# Patient Record
Sex: FEMALE | Race: BLACK OR AFRICAN AMERICAN | NOT HISPANIC OR LATINO | URBAN - METROPOLITAN AREA
[De-identification: names, ages, dates, MRNs, and addresses within clinical notes are randomized per-mention and may not be internally consistent; named-entity substitution may affect disease eponyms.]

---

## 2017-06-16 ENCOUNTER — OUTPATIENT (OUTPATIENT)
Dept: OUTPATIENT SERVICES | Facility: HOSPITAL | Age: 22
LOS: 1 days | End: 2017-06-16

## 2017-06-16 VITALS
DIASTOLIC BLOOD PRESSURE: 64 MMHG | HEIGHT: 63 IN | OXYGEN SATURATION: 98 % | HEART RATE: 100 BPM | TEMPERATURE: 97 F | RESPIRATION RATE: 16 BRPM | SYSTOLIC BLOOD PRESSURE: 100 MMHG | WEIGHT: 160.06 LBS

## 2017-06-16 DIAGNOSIS — F84.0 AUTISTIC DISORDER: ICD-10-CM

## 2017-06-16 DIAGNOSIS — Z01.818 ENCOUNTER FOR OTHER PREPROCEDURAL EXAMINATION: ICD-10-CM

## 2017-06-16 DIAGNOSIS — T78.40XA ALLERGY, UNSPECIFIED, INITIAL ENCOUNTER: ICD-10-CM

## 2017-06-16 DIAGNOSIS — E11.9 TYPE 2 DIABETES MELLITUS WITHOUT COMPLICATIONS: ICD-10-CM

## 2017-06-16 LAB
ALBUMIN SERPL ELPH-MCNC: 4.4 G/DL — SIGNIFICANT CHANGE UP (ref 3.3–5)
ALP SERPL-CCNC: 78 U/L — SIGNIFICANT CHANGE UP (ref 40–120)
ALT FLD-CCNC: 19 U/L — SIGNIFICANT CHANGE UP (ref 4–33)
AST SERPL-CCNC: 26 U/L — SIGNIFICANT CHANGE UP (ref 4–32)
BILIRUB SERPL-MCNC: 0.2 MG/DL — SIGNIFICANT CHANGE UP (ref 0.2–1.2)
BUN SERPL-MCNC: 11 MG/DL — SIGNIFICANT CHANGE UP (ref 7–23)
CALCIUM SERPL-MCNC: 10 MG/DL — SIGNIFICANT CHANGE UP (ref 8.4–10.5)
CHLORIDE SERPL-SCNC: 99 MMOL/L — SIGNIFICANT CHANGE UP (ref 98–107)
CO2 SERPL-SCNC: 22 MMOL/L — SIGNIFICANT CHANGE UP (ref 22–31)
CREAT SERPL-MCNC: 0.85 MG/DL — SIGNIFICANT CHANGE UP (ref 0.5–1.3)
GLUCOSE SERPL-MCNC: 110 MG/DL — HIGH (ref 70–99)
HBA1C BLD-MCNC: 6.1 % — HIGH (ref 4–5.6)
HCG SERPL-ACNC: < 5 MIU/ML — SIGNIFICANT CHANGE UP
HCT VFR BLD CALC: 41.2 % — SIGNIFICANT CHANGE UP (ref 34.5–45)
HGB BLD-MCNC: 13 G/DL — SIGNIFICANT CHANGE UP (ref 11.5–15.5)
MCHC RBC-ENTMCNC: 27.8 PG — SIGNIFICANT CHANGE UP (ref 27–34)
MCHC RBC-ENTMCNC: 31.6 % — LOW (ref 32–36)
MCV RBC AUTO: 88.2 FL — SIGNIFICANT CHANGE UP (ref 80–100)
PLATELET # BLD AUTO: 232 K/UL — SIGNIFICANT CHANGE UP (ref 150–400)
PMV BLD: 10.8 FL — SIGNIFICANT CHANGE UP (ref 7–13)
POTASSIUM SERPL-MCNC: 4.4 MMOL/L — SIGNIFICANT CHANGE UP (ref 3.5–5.3)
POTASSIUM SERPL-SCNC: 4.4 MMOL/L — SIGNIFICANT CHANGE UP (ref 3.5–5.3)
PROT SERPL-MCNC: 8.2 G/DL — SIGNIFICANT CHANGE UP (ref 6–8.3)
RBC # BLD: 4.67 M/UL — SIGNIFICANT CHANGE UP (ref 3.8–5.2)
RBC # FLD: 16.4 % — HIGH (ref 10.3–14.5)
SODIUM SERPL-SCNC: 138 MMOL/L — SIGNIFICANT CHANGE UP (ref 135–145)
WBC # BLD: 5.74 K/UL — SIGNIFICANT CHANGE UP (ref 3.8–10.5)
WBC # FLD AUTO: 5.74 K/UL — SIGNIFICANT CHANGE UP (ref 3.8–10.5)

## 2017-06-16 RX ORDER — POLYETHYLENE GLYCOL 3350 17 G/17G
0 POWDER, FOR SOLUTION ORAL
Qty: 0 | Refills: 0 | COMMUNITY

## 2017-06-16 NOTE — H&P PST ADULT - PMH
Autism    Diabetes  recent weight loss metformin amie oates  Mentally challenged Autism    Diabetes  recent weight loss metformin dc d  Impulse disorder    Mentally challenged

## 2017-06-16 NOTE — H&P PST ADULT - PROBLEM SELECTOR PLAN 1
Examination Under Anesthesia , Pap smear with Cultures, Pelvic Ultrasound , Breast Exam     Pre op instructions given to Edda Devlin; medical coordinator ; Edda appears to have a good understanding of pre op instructions     UCG dos    Pt to  for pre op m/c Examination Under Anesthesia , Pap smear with Cultures, Pelvic Ultrasound , Breast Exam     Pre op instructions given to Edda Devlin; medical coordinator ; Edda appears to have a good understanding of pre op instructions     UCG dos to be ordered / ? regarding HCG dos     Pt to Dr for pre op m/c

## 2017-06-16 NOTE — H&P PST ADULT - HISTORY OF PRESENT ILLNESS
Pt is a 22 y.o. female ; pt resides in [facility] ; pt with h/o autism ; pt is accompanied by Medical Coordinator Edda Devlin ; information obtained from Medical Coordinator ; pt presents for Examination Under Anesthesia , pap smear with Cultures, Pelvic Ultrasound Breast Exam     As per Ms Devlin ; pt mother Ashly Head is aware of pts appt and has signed consent

## 2017-06-16 NOTE — H&P PST ADULT - PROBLEM SELECTOR PLAN 3
Regarding consent as per medical coordinator Edda Devlin ; mother has signed consent for procedure ; mother will be available dos by phone

## 2017-06-16 NOTE — H&P PST ADULT - RS GEN PE MLT RESP DETAILS PC
clear to auscultation bilaterally/good air movement/breath sounds equal/airway patent/respirations non-labored

## 2017-06-16 NOTE — H&P PST ADULT - NSANTHOSAYNRD_GEN_A_CORE
No. TIERNEY screening performed.  STOP BANG Legend: 0-2 = LOW Risk; 3-4 = INTERMEDIATE Risk; 5-8 = HIGH Risk

## 2017-06-27 ENCOUNTER — OUTPATIENT (OUTPATIENT)
Dept: OUTPATIENT SERVICES | Facility: HOSPITAL | Age: 22
LOS: 1 days | Discharge: HOME | End: 2017-06-27
Payer: MEDICARE

## 2017-06-27 ENCOUNTER — RESULT REVIEW (OUTPATIENT)
Age: 22
End: 2017-06-27

## 2017-06-27 VITALS
TEMPERATURE: 98 F | HEIGHT: 64 IN | OXYGEN SATURATION: 97 % | RESPIRATION RATE: 14 BRPM | WEIGHT: 160.94 LBS | DIASTOLIC BLOOD PRESSURE: 75 MMHG | SYSTOLIC BLOOD PRESSURE: 123 MMHG | HEART RATE: 84 BPM

## 2017-06-27 VITALS
DIASTOLIC BLOOD PRESSURE: 85 MMHG | OXYGEN SATURATION: 98 % | TEMPERATURE: 97 F | SYSTOLIC BLOOD PRESSURE: 115 MMHG | RESPIRATION RATE: 12 BRPM | HEART RATE: 83 BPM

## 2017-06-27 DIAGNOSIS — F84.0 AUTISTIC DISORDER: ICD-10-CM

## 2017-06-27 PROCEDURE — 57410 PELVIC EXAMINATION: CPT | Mod: GC

## 2017-06-27 PROCEDURE — 57452 EXAM OF CERVIX W/SCOPE: CPT | Mod: GC

## 2017-06-27 RX ORDER — SODIUM CHLORIDE 9 MG/ML
1000 INJECTION, SOLUTION INTRAVENOUS
Qty: 0 | Refills: 0 | Status: DISCONTINUED | OUTPATIENT
Start: 2017-06-27 | End: 2017-06-28

## 2017-06-27 NOTE — BRIEF OPERATIVE NOTE - PROCEDURE
Exam under anesthesia, gynecologic  06/27/2017    Active  [id number]  Exam under anesthesia, breast  06/27/2017    Active  [id number]  Pap smear  06/27/2017    Active  [id number]

## 2017-06-27 NOTE — ASU DISCHARGE PLAN (ADULT/PEDIATRIC). - NURSING INSTRUCTIONS
You received IV tylenol in the OR at 12:30 pm  , your next dose of tylenol (if needed) will be in 6 hrs at 6:30pm

## 2017-06-27 NOTE — BRIEF OPERATIVE NOTE - OPERATION/FINDINGS
intact hymen, scarring on bilateral breasts, R breast scarring upper later quadrant, L axilla scarring extending to back; abdominal scarring noted as well  small mobile uterus, retroverted, no adnexal masses palpated

## 2017-06-27 NOTE — ASU DISCHARGE PLAN (ADULT/PEDIATRIC). - MEDICATION SUMMARY - MEDICATIONS TO TAKE
I will START or STAY ON the medications listed below when I get home from the hospital:    gabapentin 300 mg oral tablet  --  by mouth 3 times a day  -- Indication: For home med    Depakote  mg oral tablet, extended release  -- 2 tab(s) by mouth once a day (at bedtime)  -- Indication: For home med    clonazePAM 1 mg oral tablet  --  by mouth 2 times a day  -- Indication: For home med    Depakote 500 mg oral delayed release tablet  -- 2 tab(s) by mouth once a day (at bedtime)  -- Indication: For home med    Latuda 80 mg oral tablet  -- 1 tab(s) by mouth once a day  -- Indication: For home med    QUEtiapine 400 mg oral tablet  --  by mouth once a day in am  -- Indication: For home med    QUEtiapine 200 mg oral tablet  -- 1 tab(s) by mouth 2 times a day 1pm and 9pm  -- Indication: For home med    Epi EZ Pen  --   , As Needed  -- Indication: For home med    Aquaphor topical ointment  -- Apply on skin to affected area 2 times a day  -- Indication: For home med    Dulcolax Laxative 5 mg oral delayed release tablet  --  by mouth 3 tabs in am  -- Indication: For home med    MiraLax oral powder for reconstitution  --  by mouth every other day  -- Indication: For home med    Vitamin D3 1000 intl units oral capsule  -- 1 cap(s) by mouth once a day  -- Indication: For home med

## 2017-06-28 ENCOUNTER — TRANSCRIPTION ENCOUNTER (OUTPATIENT)
Age: 22
End: 2017-06-28

## 2018-07-18 ENCOUNTER — APPOINTMENT (OUTPATIENT)
Dept: OBGYN | Facility: HOSPITAL | Age: 23
End: 2018-07-18

## 2018-07-20 PROBLEM — F63.9 IMPULSE DISORDER, UNSPECIFIED: Chronic | Status: ACTIVE | Noted: 2017-06-16

## 2018-07-20 PROBLEM — F84.0 AUTISTIC DISORDER: Chronic | Status: ACTIVE | Noted: 2017-06-16

## 2018-08-09 ENCOUNTER — OUTPATIENT (OUTPATIENT)
Dept: OUTPATIENT SERVICES | Facility: HOSPITAL | Age: 23
LOS: 1 days | End: 2018-08-09

## 2018-08-09 ENCOUNTER — APPOINTMENT (OUTPATIENT)
Dept: OBGYN | Facility: HOSPITAL | Age: 23
End: 2018-08-09
Payer: MEDICARE

## 2018-08-09 VITALS
WEIGHT: 155 LBS | HEIGHT: 64 IN | BODY MASS INDEX: 26.46 KG/M2 | SYSTOLIC BLOOD PRESSURE: 114 MMHG | HEART RATE: 97 BPM | DIASTOLIC BLOOD PRESSURE: 74 MMHG

## 2018-08-09 PROCEDURE — 99212 OFFICE O/P EST SF 10 MIN: CPT

## 2018-08-15 DIAGNOSIS — N64.89 OTHER SPECIFIED DISORDERS OF BREAST: ICD-10-CM

## 2018-08-28 ENCOUNTER — OUTPATIENT (OUTPATIENT)
Dept: OUTPATIENT SERVICES | Facility: HOSPITAL | Age: 23
LOS: 1 days | End: 2018-08-28
Payer: MEDICARE

## 2018-08-28 ENCOUNTER — APPOINTMENT (OUTPATIENT)
Dept: ULTRASOUND IMAGING | Facility: IMAGING CENTER | Age: 23
End: 2018-08-28
Payer: MEDICARE

## 2018-08-28 DIAGNOSIS — N63.0 UNSPECIFIED LUMP IN UNSPECIFIED BREAST: ICD-10-CM

## 2018-08-28 PROCEDURE — 76641 ULTRASOUND BREAST COMPLETE: CPT | Mod: 26,RT

## 2018-08-28 PROCEDURE — 76641 ULTRASOUND BREAST COMPLETE: CPT

## 2018-09-05 ENCOUNTER — OUTPATIENT (OUTPATIENT)
Dept: OUTPATIENT SERVICES | Facility: HOSPITAL | Age: 23
LOS: 1 days | End: 2018-09-05

## 2018-09-05 ENCOUNTER — APPOINTMENT (OUTPATIENT)
Dept: OBGYN | Facility: HOSPITAL | Age: 23
End: 2018-09-05
Payer: MEDICARE

## 2018-09-05 VITALS
WEIGHT: 152 LBS | BODY MASS INDEX: 26.09 KG/M2 | DIASTOLIC BLOOD PRESSURE: 68 MMHG | SYSTOLIC BLOOD PRESSURE: 109 MMHG | HEART RATE: 119 BPM

## 2018-09-05 DIAGNOSIS — N63.0 UNSPECIFIED LUMP IN UNSPECIFIED BREAST: ICD-10-CM

## 2018-09-05 PROCEDURE — 99213 OFFICE O/P EST LOW 20 MIN: CPT | Mod: GC

## 2018-09-06 DIAGNOSIS — N63.0 UNSPECIFIED LUMP IN UNSPECIFIED BREAST: ICD-10-CM
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
## 2019-09-16 ENCOUNTER — OUTPATIENT (OUTPATIENT)
Dept: OUTPATIENT SERVICES | Facility: HOSPITAL | Age: 24
LOS: 1 days | End: 2019-09-16

## 2019-09-16 ENCOUNTER — APPOINTMENT (OUTPATIENT)
Dept: OBGYN | Facility: HOSPITAL | Age: 24
End: 2019-09-16
Payer: MEDICARE

## 2019-09-16 VITALS
HEIGHT: 64 IN | SYSTOLIC BLOOD PRESSURE: 134 MMHG | DIASTOLIC BLOOD PRESSURE: 80 MMHG | HEART RATE: 91 BPM | BODY MASS INDEX: 23.98 KG/M2 | WEIGHT: 140.43 LBS

## 2019-09-16 DIAGNOSIS — Z00.00 ENCOUNTER FOR GENERAL ADULT MEDICAL EXAMINATION W/OUT ABNORMAL FINDINGS: ICD-10-CM

## 2019-09-16 PROCEDURE — G0101: CPT

## 2019-09-19 DIAGNOSIS — Z00.00 ENCOUNTER FOR GENERAL ADULT MEDICAL EXAMINATION WITHOUT ABNORMAL FINDINGS: ICD-10-CM

## 2019-09-23 NOTE — HISTORY OF PRESENT ILLNESS
[1 Year Ago] : 1 year ago [Good] : being in good health [Reproductive Age] : is of reproductive age [Definite:  ___ (Date)] : the last menstrual period was [unfilled] [Normal Amount/Duration] : was of a normal amount and duration [Regular Cycle Intervals] : periods have been regular [Frequency: Q ___ days] : menstrual periods occur approximately every [unfilled] days [Menstrual Problems] : reports normal menses [Weight Concerns] : no concerns with her weight [Pregnancy History] : denies prior pregnancies [Fever] : no fever [Nausea] : no nausea [Vomiting] : no vomiting [Diarrhea] : no diarrhea [Vaginal Bleeding] : no vaginal bleeding [Pelvic Pressure] : no pelvic pressure [Dysuria] : no dysuria [Spotting Between  Menses] : no spotting between menses [Sexually Active] : is not sexually active

## 2019-09-23 NOTE — PHYSICAL EXAM
[Awake] : awake [Alert] : alert [Soft] : soft [Flat] : flat [Soft, Nontender] : the abdomen was soft and nontender [No Mass] : no masses were palpated [No HSM] : no hepatosplenomegaly noted [No Lesions] : no genitalia lesions [Labia Majora] : labia major [Labia Minora] : labia minora [Normal] : clitoris [Pink Rugae] : pink rugae [RRR, No Murmurs] : RRR, no murmurs [Normal S1] : normal S1 [Normal Rate] : normal [Normal S2] : normal S2 [Arterial Pulses Equal At ___ Bilat (/N Is Sub: Default = /2)] : the peripheral pulses were 2+ and symmetric [No Pitting Edema] : no pitting edema present [CTAB] : CTAB [Acute Distress] : no acute distress [LAD] : no lymphadenopathy [Goiter] : no goiter [Tender] : non tender [Distended] : not distended [H/Smegaly] : no hepatosplenomegaly [Urethral Discharge] : no ~M urethral discharge [Pap Obtained] : a Pap smear was not performed [Discharge] : had no discharge

## 2019-09-23 NOTE — REVIEW OF SYSTEMS
[Fever] : no fever [Chills] : no chills [Recent Wt Gain ___ Lbs] : no recent weight gain [Pain] : no pain [Sight Problems] : no sight problems [Dec Hearing] : no hearing loss [Sore Throat] : no sore throat [Heart Rate Is Fast] : the heart rate was not fast [Chest Pain] : no chest pain [Wheezing] : no wheezing [Dyspnea] : no shortness of breath [SOB on Exertion] : no shortness of breath during exertion [Cough] : no cough [Abdominal Pain] : no abdominal pain [Vomiting] : no vomiting [Constipation] : no constipation [Diarrhea] : no diarrhea [Incontinence] : no incontinence [Abn Vag Bleeding] : no abnormal vaginal bleeding [Pelvic Pain] : no pelvic pain [Frequency] : no frequency [Urgency] : no urgency [Urethral Discharge] : no abnormal urethral discharge [Arthralgias] : no arthralgias [Joint Pain] : no joint pain [Skin Lesions] : no skin lesions [Breast Pain] : no breast pain [Fainting] : no fainting [Headache] : no headache [Anxiety] : no anxiety [Depression] : no depression

## 2019-11-04 NOTE — H&P PST ADULT - VENOUS THROMBOEMBOLISM HISTORY
spoon/fed by clinician/self fed/bites of cookie cup/spoon/self fed/fed by clinician/straw negative history

## 2020-02-27 NOTE — H&P PST ADULT - EXTREMITIES
What Type Of Note Output Would You Prefer (Optional)?: Standard Output
How Severe Is Your Rash?: moderate
Is This A New Presentation, Or A Follow-Up?: Rash
detailed exam

## 2020-10-28 ENCOUNTER — OUTPATIENT (OUTPATIENT)
Dept: OUTPATIENT SERVICES | Facility: HOSPITAL | Age: 25
LOS: 1 days | End: 2020-10-28

## 2020-10-28 ENCOUNTER — APPOINTMENT (OUTPATIENT)
Dept: OBGYN | Facility: HOSPITAL | Age: 25
End: 2020-10-28
Payer: MEDICARE

## 2020-10-28 VITALS
SYSTOLIC BLOOD PRESSURE: 130 MMHG | DIASTOLIC BLOOD PRESSURE: 71 MMHG | TEMPERATURE: 97.3 F | HEART RATE: 94 BPM | HEIGHT: 64 IN | WEIGHT: 176 LBS | BODY MASS INDEX: 30.05 KG/M2

## 2020-10-28 PROCEDURE — 99213 OFFICE O/P EST LOW 20 MIN: CPT | Mod: GC

## 2020-10-29 NOTE — REVIEW OF SYSTEMS
[Fever] : no fever [Chills] : no chills [Abdominal Pain] : no abdominal pain [Vomiting] : no vomiting [Breast Pain] : no breast pain [Breast Lump] : no breast lump [Nipple Discharge] : no nipple discharge

## 2020-10-29 NOTE — PHYSICAL EXAM
[Alert] : alert [No Acute Distress] : no acute distress [No Lymphadenopathy] : no lymphadenopathy [Regular Rate Rhythm] : regular rate rhythm [Clear to Auscultation B/L] : clear to auscultation bilaterally [Soft] : soft [Non-tender] : non-tender [No Mass] : no mass [FreeTextEntry6] : No masses, no tenderness, no nipple discharge

## 2020-10-30 ENCOUNTER — NON-APPOINTMENT (OUTPATIENT)
Age: 25
End: 2020-10-30

## 2020-10-30 DIAGNOSIS — Z01.419 ENCOUNTER FOR GYNECOLOGICAL EXAMINATION (GENERAL) (ROUTINE) WITHOUT ABNORMAL FINDINGS: ICD-10-CM

## 2020-12-15 ENCOUNTER — NON-APPOINTMENT (OUTPATIENT)
Age: 25
End: 2020-12-15

## 2021-01-22 ENCOUNTER — NON-APPOINTMENT (OUTPATIENT)
Age: 26
End: 2021-01-22

## 2021-05-17 ENCOUNTER — OUTPATIENT (OUTPATIENT)
Dept: OUTPATIENT SERVICES | Facility: HOSPITAL | Age: 26
LOS: 1 days | End: 2021-05-17
Payer: MEDICARE

## 2021-05-17 VITALS
HEART RATE: 83 BPM | TEMPERATURE: 98 F | OXYGEN SATURATION: 100 % | DIASTOLIC BLOOD PRESSURE: 84 MMHG | WEIGHT: 169.98 LBS | RESPIRATION RATE: 18 BRPM | SYSTOLIC BLOOD PRESSURE: 126 MMHG | HEIGHT: 60 IN

## 2021-05-17 DIAGNOSIS — Z01.818 ENCOUNTER FOR OTHER PREPROCEDURAL EXAMINATION: ICD-10-CM

## 2021-05-17 DIAGNOSIS — K02.62 DENTAL CARIES ON SMOOTH SURFACE PENETRATING INTO DENTIN: ICD-10-CM

## 2021-05-17 DIAGNOSIS — Z98.890 OTHER SPECIFIED POSTPROCEDURAL STATES: Chronic | ICD-10-CM

## 2021-05-17 DIAGNOSIS — K05.6 PERIODONTAL DISEASE, UNSPECIFIED: ICD-10-CM

## 2021-05-17 DIAGNOSIS — K02.9 DENTAL CARIES, UNSPECIFIED: ICD-10-CM

## 2021-05-17 DIAGNOSIS — J38.3 OTHER DISEASES OF VOCAL CORDS: Chronic | ICD-10-CM

## 2021-05-17 DIAGNOSIS — E11.9 TYPE 2 DIABETES MELLITUS WITHOUT COMPLICATIONS: ICD-10-CM

## 2021-05-17 PROCEDURE — G0463: CPT

## 2021-05-17 RX ORDER — QUETIAPINE FUMARATE 200 MG/1
0 TABLET, FILM COATED ORAL
Qty: 0 | Refills: 0 | DISCHARGE

## 2021-05-17 RX ORDER — DIVALPROEX SODIUM 500 MG/1
2 TABLET, DELAYED RELEASE ORAL
Qty: 0 | Refills: 0 | DISCHARGE

## 2021-05-17 RX ORDER — QUETIAPINE FUMARATE 200 MG/1
1 TABLET, FILM COATED ORAL
Qty: 0 | Refills: 0 | DISCHARGE

## 2021-05-17 RX ORDER — LANOLIN/MINERAL OIL
1 LOTION (ML) TOPICAL
Qty: 0 | Refills: 0 | DISCHARGE

## 2021-05-17 RX ORDER — GABAPENTIN 400 MG/1
0 CAPSULE ORAL
Qty: 0 | Refills: 0 | DISCHARGE

## 2021-05-17 RX ORDER — POLYETHYLENE GLYCOL 3350 17 G/17G
0 POWDER, FOR SOLUTION ORAL
Qty: 0 | Refills: 0 | DISCHARGE

## 2021-05-17 RX ORDER — LURASIDONE HYDROCHLORIDE 40 MG/1
1 TABLET ORAL
Qty: 0 | Refills: 0 | DISCHARGE

## 2021-05-17 NOTE — H&P PST ADULT - NSICDXPASTSURGICALHX_GEN_ALL_CORE_FT
Subjective:       Patient ID: Rosa Maria Kay is a 38 y.o. female.    Chief Complaint: Disease Management    HPI  39 yo F with PMH of RLS and interstitial cystitis for evaluation of YINA.  Reports that she has numbness sensation in right second and third toes bilaterally. Denies any swelling or stiffness.  Reports her symptoms have got worse. Reports her pain level is today is 2/10. Sometimes, she was diagnosed with RLS 2 years ago. She had crawling sensation in right leg and it was only at night.  Reports that the requip helped. Denies raynauds, oral ulcers, hair loss, or photosensitivity. Denies fevers or pleurisy.  Reports she wakes up really hot at night.  Denies triggers for her numbness. Denies any alleviating factors. She takes advil at night which helps her with improvement.  Reports that pain is worse in right side.    Past Medical History:   Diagnosis Date    Interstitial cystitis     Dr. Wheeler       Review of Systems   Constitutional: Negative for activity change, appetite change, chills, diaphoresis, fatigue, fever and unexpected weight change.   HENT: Negative for congestion, ear discharge, ear pain, facial swelling, mouth sores, sinus pressure, sneezing, sore throat, tinnitus and trouble swallowing.    Eyes: Negative for photophobia, pain, discharge, redness, itching and visual disturbance.   Respiratory: Negative for apnea, cough, chest tightness, shortness of breath, wheezing and stridor.    Cardiovascular: Negative for chest pain and leg swelling.   Gastrointestinal: Negative for abdominal distention, abdominal pain, anal bleeding, blood in stool, constipation, diarrhea and nausea.   Endocrine: Negative for cold intolerance and heat intolerance.   Genitourinary: Negative for difficulty urinating, dysuria and genital sores.   Musculoskeletal: Positive for arthralgias. Negative for back pain, gait problem, joint swelling, myalgias, neck pain and neck stiffness.   Skin: Negative for color  "change, pallor, rash and wound.   Neurological: Positive for headaches. Negative for dizziness and seizures.   Hematological: Negative for adenopathy. Does not bruise/bleed easily.   Psychiatric/Behavioral: Negative for sleep disturbance. The patient is not nervous/anxious.            Objective:   BP (!) 192/71   Pulse 72   Resp 18   Ht 5' 1" (1.549 m)   Wt 49.1 kg (108 lb 4.8 oz)   BMI 20.46 kg/m²      Physical Exam   Constitutional: She is oriented to person, place, and time.   HENT:   Head: Normocephalic and atraumatic.   Right Ear: External ear normal.   Left Ear: External ear normal.   Nose: Nose normal.   Mouth/Throat: Oropharynx is clear and moist. No oropharyngeal exudate.   Eyes: Conjunctivae and EOM are normal. Pupils are equal, round, and reactive to light. Right eye exhibits no discharge. Left eye exhibits no discharge. No scleral icterus.   Neck: Neck supple. No JVD present. No thyromegaly present.   Cardiovascular: Normal rate, regular rhythm, normal heart sounds and intact distal pulses.  Exam reveals no gallop and no friction rub.    No murmur heard.  Pulmonary/Chest: Effort normal and breath sounds normal. No respiratory distress. She has no wheezes. She has no rales. She exhibits no tenderness.   Abdominal: Soft. Bowel sounds are normal. She exhibits no distension and no mass. There is no tenderness. There is no rebound and no guarding.   Lymphadenopathy:     She has no cervical adenopathy.   Neurological: She is alert and oriented to person, place, and time. No cranial nerve deficit. Gait normal. Coordination normal.   Skin: Skin is dry. No rash noted. No erythema. No pallor.     Psychiatric: Affect and judgment normal.   Musculoskeletal: She exhibits no edema, tenderness or deformity.          labs: reviewed       Assessment:     39 yo F with PMH of RLS and interstitial cystitis for evaluation of YINA.  Patient does not have any signs or symptoms of SLE but she did have mild leukopenia so " will work her up further.  She has numbness and pain worse in right foot. I will work her up for a neuroma and if negative will send her to neurology for evaluation of paresthesias.  No diagnosis found.        Plan:       Labs  MRI  Discussed with patient causes of +YINA  *     PAST SURGICAL HISTORY:  No significant past surgical history      PAST SURGICAL HISTORY:  History of lumbar laminectomy     Vocal cord disease vocal cord surgery?

## 2021-05-17 NOTE — H&P PST ADULT - NSICDXPROBLEM_GEN_ALL_CORE_FT
PROBLEM DIAGNOSES  Problem: Dental caries  Assessment and Plan: comprehensive dental treatment under anesthesia  ENT and medical evaluation required  prior to surgery    Problem: Diabetes mellitus  Assessment and Plan: hold metformin 24 hours pre op

## 2021-05-17 NOTE — H&P PST ADULT - NSICDXPASTMEDICALHX_GEN_ALL_CORE_FT
PAST MEDICAL HISTORY:  Autism     Diabetes recent weight loss metformin dc d    Impulse disorder     Mentally challenged      PAST MEDICAL HISTORY:  Autism     Depression     Diabetes stable    Impulse disorder     Mentally challenged

## 2021-05-17 NOTE — H&P PST ADULT - HISTORY OF PRESENT ILLNESS
This is a 24 y/o female with autism with dental caries , she presents today for comprehensive dental treatment with anesthesia 5/21/21, history obtained from group home staff, per group home staff, pt "had vocal cord surgery few years ago"  and use thicken liquids to nectar consistency,  unable to verified vocal cord surgery and pt not cooperative unable to assess airway , ENT evaluation required prior to surgery, left message for DR Lisette fraser swab to be done 5/18 at [facility], denies fever, cough,  SOB, or change in taste/smell

## 2021-05-18 ENCOUNTER — OUTPATIENT (OUTPATIENT)
Dept: OUTPATIENT SERVICES | Facility: HOSPITAL | Age: 26
LOS: 1 days | End: 2021-05-18
Payer: MEDICARE

## 2021-05-18 DIAGNOSIS — J38.3 OTHER DISEASES OF VOCAL CORDS: Chronic | ICD-10-CM

## 2021-05-18 DIAGNOSIS — Z98.890 OTHER SPECIFIED POSTPROCEDURAL STATES: Chronic | ICD-10-CM

## 2021-05-18 DIAGNOSIS — Z11.52 ENCOUNTER FOR SCREENING FOR COVID-19: ICD-10-CM

## 2021-05-18 LAB — SARS-COV-2 RNA SPEC QL NAA+PROBE: SIGNIFICANT CHANGE UP

## 2021-05-18 PROCEDURE — U0003: CPT

## 2021-05-18 PROCEDURE — U0005: CPT

## 2021-05-18 PROCEDURE — C9803: CPT

## 2021-06-04 ENCOUNTER — OUTPATIENT (OUTPATIENT)
Dept: OUTPATIENT SERVICES | Facility: HOSPITAL | Age: 26
LOS: 1 days | End: 2021-06-04
Payer: MEDICARE

## 2021-06-04 DIAGNOSIS — Z98.890 OTHER SPECIFIED POSTPROCEDURAL STATES: Chronic | ICD-10-CM

## 2021-06-04 DIAGNOSIS — J38.3 OTHER DISEASES OF VOCAL CORDS: Chronic | ICD-10-CM

## 2021-06-04 PROCEDURE — 82962 GLUCOSE BLOOD TEST: CPT

## 2021-06-07 DIAGNOSIS — K02.62 DENTAL CARIES ON SMOOTH SURFACE PENETRATING INTO DENTIN: ICD-10-CM

## 2021-06-07 DIAGNOSIS — K05.6 PERIODONTAL DISEASE, UNSPECIFIED: ICD-10-CM

## 2021-06-07 PROBLEM — E11.9 TYPE 2 DIABETES MELLITUS WITHOUT COMPLICATIONS: Chronic | Status: ACTIVE | Noted: 2017-06-16

## 2021-06-07 PROBLEM — F32.9 MAJOR DEPRESSIVE DISORDER, SINGLE EPISODE, UNSPECIFIED: Chronic | Status: ACTIVE | Noted: 2021-05-17

## 2021-10-01 ENCOUNTER — OUTPATIENT (OUTPATIENT)
Dept: OUTPATIENT SERVICES | Facility: HOSPITAL | Age: 26
LOS: 1 days | End: 2021-10-01
Payer: MEDICARE

## 2021-10-01 VITALS
WEIGHT: 164.02 LBS | DIASTOLIC BLOOD PRESSURE: 83 MMHG | SYSTOLIC BLOOD PRESSURE: 118 MMHG | RESPIRATION RATE: 16 BRPM | HEART RATE: 92 BPM | HEIGHT: 64 IN | OXYGEN SATURATION: 96 % | TEMPERATURE: 97 F

## 2021-10-01 DIAGNOSIS — Z01.818 ENCOUNTER FOR OTHER PREPROCEDURAL EXAMINATION: ICD-10-CM

## 2021-10-01 DIAGNOSIS — K02.9 DENTAL CARIES, UNSPECIFIED: ICD-10-CM

## 2021-10-01 DIAGNOSIS — K02.62 DENTAL CARIES ON SMOOTH SURFACE PENETRATING INTO DENTIN: ICD-10-CM

## 2021-10-01 DIAGNOSIS — K05.6 PERIODONTAL DISEASE, UNSPECIFIED: ICD-10-CM

## 2021-10-01 DIAGNOSIS — J38.3 OTHER DISEASES OF VOCAL CORDS: Chronic | ICD-10-CM

## 2021-10-01 DIAGNOSIS — Z98.890 OTHER SPECIFIED POSTPROCEDURAL STATES: Chronic | ICD-10-CM

## 2021-10-01 DIAGNOSIS — K02.9 DENTAL CARIES, UNSPECIFIED: Chronic | ICD-10-CM

## 2021-10-01 LAB
A1C WITH ESTIMATED AVERAGE GLUCOSE RESULT: 5.6 % — SIGNIFICANT CHANGE UP (ref 4–5.6)
ANION GAP SERPL CALC-SCNC: 13 MMOL/L — SIGNIFICANT CHANGE UP (ref 5–17)
BUN SERPL-MCNC: 7 MG/DL — SIGNIFICANT CHANGE UP (ref 7–23)
CALCIUM SERPL-MCNC: 9.5 MG/DL — SIGNIFICANT CHANGE UP (ref 8.4–10.5)
CHLORIDE SERPL-SCNC: 101 MMOL/L — SIGNIFICANT CHANGE UP (ref 96–108)
CO2 SERPL-SCNC: 24 MMOL/L — SIGNIFICANT CHANGE UP (ref 22–31)
CREAT SERPL-MCNC: 0.65 MG/DL — SIGNIFICANT CHANGE UP (ref 0.5–1.3)
ESTIMATED AVERAGE GLUCOSE: 114 MG/DL — SIGNIFICANT CHANGE UP (ref 68–114)
GLUCOSE SERPL-MCNC: 62 MG/DL — LOW (ref 70–99)
HCT VFR BLD CALC: 37.8 % — SIGNIFICANT CHANGE UP (ref 34.5–45)
HGB BLD-MCNC: 11.8 G/DL — SIGNIFICANT CHANGE UP (ref 11.5–15.5)
MCHC RBC-ENTMCNC: 27.7 PG — SIGNIFICANT CHANGE UP (ref 27–34)
MCHC RBC-ENTMCNC: 31.2 GM/DL — LOW (ref 32–36)
MCV RBC AUTO: 88.7 FL — SIGNIFICANT CHANGE UP (ref 80–100)
NRBC # BLD: 0 /100 WBCS — SIGNIFICANT CHANGE UP (ref 0–0)
PLATELET # BLD AUTO: 151 K/UL — SIGNIFICANT CHANGE UP (ref 150–400)
POTASSIUM SERPL-MCNC: 4.5 MMOL/L — SIGNIFICANT CHANGE UP (ref 3.5–5.3)
POTASSIUM SERPL-SCNC: 4.5 MMOL/L — SIGNIFICANT CHANGE UP (ref 3.5–5.3)
RBC # BLD: 4.26 M/UL — SIGNIFICANT CHANGE UP (ref 3.8–5.2)
RBC # FLD: 16.3 % — HIGH (ref 10.3–14.5)
SODIUM SERPL-SCNC: 138 MMOL/L — SIGNIFICANT CHANGE UP (ref 135–145)
WBC # BLD: 6.24 K/UL — SIGNIFICANT CHANGE UP (ref 3.8–10.5)
WBC # FLD AUTO: 6.24 K/UL — SIGNIFICANT CHANGE UP (ref 3.8–10.5)

## 2021-10-01 PROCEDURE — 85027 COMPLETE CBC AUTOMATED: CPT

## 2021-10-01 PROCEDURE — G0463: CPT

## 2021-10-01 PROCEDURE — 80048 BASIC METABOLIC PNL TOTAL CA: CPT

## 2021-10-01 PROCEDURE — 71046 X-RAY EXAM CHEST 2 VIEWS: CPT | Mod: 26

## 2021-10-01 PROCEDURE — 36415 COLL VENOUS BLD VENIPUNCTURE: CPT

## 2021-10-01 PROCEDURE — 71046 X-RAY EXAM CHEST 2 VIEWS: CPT

## 2021-10-01 PROCEDURE — 83036 HEMOGLOBIN GLYCOSYLATED A1C: CPT

## 2021-10-01 NOTE — H&P PST ADULT - NSICDXPASTMEDICALHX_GEN_ALL_CORE_FT
PAST MEDICAL HISTORY:  Autism     Depression     Diabetes stable    Impulse disorder     Mentally challenged

## 2021-10-01 NOTE — H&P PST ADULT - NSICDXPASTSURGICALHX_GEN_ALL_CORE_FT
PAST SURGICAL HISTORY:  Dental caries Comprehensive dental cleaning 5/2021    History of lumbar laminectomy     Vocal cord disease vocal cord surgery?

## 2021-10-01 NOTE — H&P PST ADULT - PROBLEM SELECTOR PLAN 1
scheduled Comprehensive dental treatment under anesthesia on 101/15/21.  -preop instructions given to staff  -labs: CBC, BMP, A1c done in PST  -Obtain PCP Medical clearance, GH will make appt

## 2021-10-01 NOTE — H&P PST ADULT - HISTORY OF PRESENT ILLNESS
This is a 26 y/o female with autism with dental caries , she presents today for comprehensive dental treatment with anesthesia 5/21/21, history obtained from group home staff, per group home staff, pt "had vocal cord surgery few years ago"  and use thicken liquids to nectar consistency,  unable to verified vocal cord surgery and pt not cooperative unable to assess airway , ENT evaluation required prior to surgery, left message for DR Lisette fraser swab to be done 5/18 at [facility], denies fever, cough,  SOB, or change in taste/smell     This is a 25 y/o female with autism with dental caries , she presents today for comprehensive dental treatment with anesthesia 10/15/21, history obtained from group home staff, per group home staff, pt "had vocal cord surgery few years ago"  and use thicken liquids to nectar consistency,  unable to verified vocal cord surgery and pt not cooperative unable to assess airway. Chest xray ordered. Pt had previous dental cleaning in 5/2021. Pt evaluated by dr. Cortes for a scheduled Comprehensive dental treatment under anesthesia on 101/15/21. Staff denies any recent travel or sick contact.     **Covid swab on 10/12/21 at [facility]  **Consents from mother: Ashly Head [phone] This is a 25 y/o female with autism with dental caries , she presents today for comprehensive dental treatment with anesthesia 10/15/21, history obtained from group home staff, per group home staff, pt "had vocal cord surgery few years ago"  and use thicken liquids to nectar consistency,  unable to verified vocal cord surgery and pt not cooperative unable to assess airway. Chest xray ordered. Pt had previous dental cleaning in 5/2021. Pt evaluated by dr. Cortes for a scheduled Comprehensive dental treatment under anesthesia on 101/15/21. Staff denies any recent travel or sick contact.     **Covid swab on 10/12/21 at [facility]  **Consents from mother: Ashly Head [phone]  **Reviewed visit 6/27/2017: Anesthesia Record from [facility] pages 10 &12, no Anesthesia issues,: General Anesthesia, LMA for Airway This is a 27 y/o female with autism with dental caries , she presents today for comprehensive dental treatment with anesthesia 10/15/21, history obtained from group home staff, per group home staff, pt "had vocal cord surgery few years ago"  and use thicken liquids to nectar consistency,  unable to verified vocal cord surgery and pt not cooperative unable to assess airway. Chest xray ordered. Pt had previous dental cleaning in 5/2021. Pt evaluated by Dr. Cortes for a scheduled Comprehensive dental treatment under anesthesia on 101/15/21. Staff denies any recent travel or sick contact.     **Covid swab on 10/12/21 at [facility]  **Consents from mother: Ashly Head [phone]  **Reviewed visit 6/27/2017: Anesthesia Record from [facility] pages 10 &12, no Anesthesia issues,: General Anesthesia, LMA for Airway

## 2021-10-13 ENCOUNTER — OUTPATIENT (OUTPATIENT)
Dept: OUTPATIENT SERVICES | Facility: HOSPITAL | Age: 26
LOS: 1 days | End: 2021-10-13
Payer: MEDICARE

## 2021-10-13 DIAGNOSIS — K02.9 DENTAL CARIES, UNSPECIFIED: Chronic | ICD-10-CM

## 2021-10-13 DIAGNOSIS — Z11.52 ENCOUNTER FOR SCREENING FOR COVID-19: ICD-10-CM

## 2021-10-13 DIAGNOSIS — J38.3 OTHER DISEASES OF VOCAL CORDS: Chronic | ICD-10-CM

## 2021-10-13 DIAGNOSIS — Z98.890 OTHER SPECIFIED POSTPROCEDURAL STATES: Chronic | ICD-10-CM

## 2021-10-13 LAB — SARS-COV-2 RNA SPEC QL NAA+PROBE: SIGNIFICANT CHANGE UP

## 2021-10-13 PROCEDURE — U0005: CPT

## 2021-10-13 PROCEDURE — C9803: CPT

## 2021-10-13 PROCEDURE — U0003: CPT

## 2021-10-14 ENCOUNTER — TRANSCRIPTION ENCOUNTER (OUTPATIENT)
Age: 26
End: 2021-10-14

## 2021-10-14 NOTE — ASU DISCHARGE PLAN (ADULT/PEDIATRIC) - ASU DC SPECIAL INSTRUCTIONSFT
comprehensive dental treatment under GA     see Dr Cortes [place] [facility] [place] [phone]     resume all medications and return to program/ routine activities on Sunday    Tylenol prn pain comprehensive dental treatment under GA     see Dr Cortes in one week at [facility] [place] [place] [phone]     resume all medications and return to program/ routine activities on Sunday    Tylenol prn pain    one extraction was performed to the upper right, wisdom tooth removed    restorative and periodontal treatment was performed comprehensive dental treatment under GA     see Dr Cortes in one week at [facility] [place] [phone]     resume all medications and return to program/ routine activities on Sunday    Tylenol prn pain    one extraction was performed to the upper right, wisdom tooth removed    restorative and periodontal treatment was performed    patient has more decay that requires treatment, Dr Cortes will schedule in the near future

## 2021-10-15 ENCOUNTER — OUTPATIENT (OUTPATIENT)
Dept: OUTPATIENT SERVICES | Facility: HOSPITAL | Age: 26
LOS: 1 days | End: 2021-10-15
Payer: MEDICARE

## 2021-10-15 VITALS
RESPIRATION RATE: 18 BRPM | HEART RATE: 95 BPM | DIASTOLIC BLOOD PRESSURE: 58 MMHG | SYSTOLIC BLOOD PRESSURE: 126 MMHG | OXYGEN SATURATION: 94 %

## 2021-10-15 VITALS
DIASTOLIC BLOOD PRESSURE: 79 MMHG | SYSTOLIC BLOOD PRESSURE: 115 MMHG | HEIGHT: 64 IN | HEART RATE: 91 BPM | OXYGEN SATURATION: 99 % | WEIGHT: 164.02 LBS | RESPIRATION RATE: 16 BRPM | TEMPERATURE: 98 F

## 2021-10-15 DIAGNOSIS — K02.62 DENTAL CARIES ON SMOOTH SURFACE PENETRATING INTO DENTIN: ICD-10-CM

## 2021-10-15 DIAGNOSIS — Z98.890 OTHER SPECIFIED POSTPROCEDURAL STATES: Chronic | ICD-10-CM

## 2021-10-15 DIAGNOSIS — K05.6 PERIODONTAL DISEASE, UNSPECIFIED: ICD-10-CM

## 2021-10-15 DIAGNOSIS — J38.3 OTHER DISEASES OF VOCAL CORDS: Chronic | ICD-10-CM

## 2021-10-15 DIAGNOSIS — K02.9 DENTAL CARIES, UNSPECIFIED: Chronic | ICD-10-CM

## 2021-10-15 LAB
GLUCOSE BLDC GLUCOMTR-MCNC: 85 MG/DL — SIGNIFICANT CHANGE UP (ref 70–99)
HCG UR QL: NEGATIVE — SIGNIFICANT CHANGE UP

## 2021-10-15 PROCEDURE — D2330: CPT

## 2021-10-15 PROCEDURE — D7210: CPT

## 2021-10-15 PROCEDURE — D1208: CPT

## 2021-10-15 PROCEDURE — 81025 URINE PREGNANCY TEST: CPT

## 2021-10-15 PROCEDURE — D2391: CPT

## 2021-10-15 PROCEDURE — D2392: CPT

## 2021-10-15 PROCEDURE — 82962 GLUCOSE BLOOD TEST: CPT

## 2021-10-15 PROCEDURE — D4341: CPT

## 2021-10-15 PROCEDURE — D2394: CPT

## 2021-10-15 PROCEDURE — D4210: CPT

## 2021-10-15 PROCEDURE — C9399: CPT

## 2021-10-15 RX ORDER — ONDANSETRON 8 MG/1
4 TABLET, FILM COATED ORAL ONCE
Refills: 0 | Status: DISCONTINUED | OUTPATIENT
Start: 2021-10-15 | End: 2021-10-15

## 2021-10-15 RX ORDER — FENTANYL CITRATE 50 UG/ML
25 INJECTION INTRAVENOUS
Refills: 0 | Status: DISCONTINUED | OUTPATIENT
Start: 2021-10-15 | End: 2021-10-15

## 2021-10-15 NOTE — ASU PREOP CHECKLIST - TEMPERATURE IN CELSIUS (DEGREES C)
89yo female with rectal bleeding, likely hemorrhoidal given no pain, as per Gastroenterology attending
36.9

## 2021-10-29 ENCOUNTER — OUTPATIENT (OUTPATIENT)
Dept: OUTPATIENT SERVICES | Facility: HOSPITAL | Age: 26
LOS: 1 days | End: 2021-10-29
Payer: MEDICARE

## 2021-10-29 DIAGNOSIS — J38.3 OTHER DISEASES OF VOCAL CORDS: Chronic | ICD-10-CM

## 2021-10-29 DIAGNOSIS — K02.9 DENTAL CARIES, UNSPECIFIED: Chronic | ICD-10-CM

## 2021-10-29 DIAGNOSIS — Z98.890 OTHER SPECIFIED POSTPROCEDURAL STATES: Chronic | ICD-10-CM

## 2021-10-29 DIAGNOSIS — Z11.52 ENCOUNTER FOR SCREENING FOR COVID-19: ICD-10-CM

## 2021-10-29 LAB — SARS-COV-2 RNA SPEC QL NAA+PROBE: SIGNIFICANT CHANGE UP

## 2021-10-29 PROCEDURE — C9803: CPT

## 2021-10-29 PROCEDURE — U0003: CPT

## 2021-10-29 PROCEDURE — U0005: CPT

## 2021-10-31 ENCOUNTER — TRANSCRIPTION ENCOUNTER (OUTPATIENT)
Age: 26
End: 2021-10-31

## 2021-11-01 ENCOUNTER — OUTPATIENT (OUTPATIENT)
Dept: OUTPATIENT SERVICES | Facility: HOSPITAL | Age: 26
LOS: 1 days | End: 2021-11-01
Payer: MEDICARE

## 2021-11-01 VITALS
TEMPERATURE: 97 F | SYSTOLIC BLOOD PRESSURE: 109 MMHG | RESPIRATION RATE: 18 BRPM | DIASTOLIC BLOOD PRESSURE: 62 MMHG | HEART RATE: 94 BPM | OXYGEN SATURATION: 95 %

## 2021-11-01 VITALS
HEART RATE: 102 BPM | DIASTOLIC BLOOD PRESSURE: 59 MMHG | SYSTOLIC BLOOD PRESSURE: 116 MMHG | RESPIRATION RATE: 20 BRPM | OXYGEN SATURATION: 96 % | TEMPERATURE: 97 F

## 2021-11-01 DIAGNOSIS — Z98.890 OTHER SPECIFIED POSTPROCEDURAL STATES: Chronic | ICD-10-CM

## 2021-11-01 DIAGNOSIS — J38.3 OTHER DISEASES OF VOCAL CORDS: Chronic | ICD-10-CM

## 2021-11-01 DIAGNOSIS — K05.6 PERIODONTAL DISEASE, UNSPECIFIED: ICD-10-CM

## 2021-11-01 DIAGNOSIS — K02.9 DENTAL CARIES, UNSPECIFIED: Chronic | ICD-10-CM

## 2021-11-01 DIAGNOSIS — K02.62 DENTAL CARIES ON SMOOTH SURFACE PENETRATING INTO DENTIN: ICD-10-CM

## 2021-11-01 LAB — HCG UR QL: NEGATIVE — SIGNIFICANT CHANGE UP

## 2021-11-01 PROCEDURE — 81025 URINE PREGNANCY TEST: CPT

## 2021-11-01 PROCEDURE — D2392: CPT

## 2021-11-01 PROCEDURE — D7210: CPT

## 2021-11-01 PROCEDURE — D2391: CPT

## 2021-11-01 PROCEDURE — D2394: CPT

## 2021-11-01 PROCEDURE — C1889: CPT

## 2021-11-01 RX ORDER — SODIUM CHLORIDE 9 MG/ML
0 INJECTION, SOLUTION INTRAVENOUS
Qty: 0 | Refills: 0 | DISCHARGE
Start: 2021-11-01

## 2021-11-01 RX ORDER — ONDANSETRON 8 MG/1
0 TABLET, FILM COATED ORAL
Qty: 0 | Refills: 0 | DISCHARGE
Start: 2021-11-01

## 2021-11-01 RX ORDER — SODIUM CHLORIDE 9 MG/ML
1000 INJECTION, SOLUTION INTRAVENOUS
Refills: 0 | Status: DISCONTINUED | OUTPATIENT
Start: 2021-11-01 | End: 2021-11-05

## 2021-11-01 RX ORDER — ONDANSETRON 8 MG/1
4 TABLET, FILM COATED ORAL ONCE
Refills: 0 | Status: DISCONTINUED | OUTPATIENT
Start: 2021-11-01 | End: 2021-11-05

## 2021-11-01 NOTE — ASU DISCHARGE PLAN (ADULT/PEDIATRIC) - ASU DC SPECIAL INSTRUCTIONSFT
comprehensive dental treatment under GA     see Dr Cortes in one week at [facility] [place] [phone] Nov 9 11 am    resume all medications     return to program/ routine activities tomorrow     Tylenol prn pain    extractions performed to the left posterior   restorative and periodontal treatment was performed

## 2022-03-07 ENCOUNTER — APPOINTMENT (OUTPATIENT)
Dept: OBGYN | Facility: HOSPITAL | Age: 27
End: 2022-03-07

## 2022-07-05 ENCOUNTER — APPOINTMENT (OUTPATIENT)
Dept: OBGYN | Facility: HOSPITAL | Age: 27
End: 2022-07-05

## 2022-08-03 ENCOUNTER — APPOINTMENT (OUTPATIENT)
Dept: OBGYN | Facility: HOSPITAL | Age: 27
End: 2022-08-03

## 2022-08-31 ENCOUNTER — RESULT REVIEW (OUTPATIENT)
Age: 27
End: 2022-08-31

## 2022-08-31 ENCOUNTER — APPOINTMENT (OUTPATIENT)
Dept: OBGYN | Facility: HOSPITAL | Age: 27
End: 2022-08-31

## 2022-08-31 ENCOUNTER — OUTPATIENT (OUTPATIENT)
Dept: OUTPATIENT SERVICES | Facility: HOSPITAL | Age: 27
LOS: 1 days | End: 2022-08-31

## 2022-08-31 VITALS
HEART RATE: 96 BPM | HEIGHT: 64 IN | TEMPERATURE: 97 F | BODY MASS INDEX: 28.68 KG/M2 | WEIGHT: 168 LBS | DIASTOLIC BLOOD PRESSURE: 63 MMHG | SYSTOLIC BLOOD PRESSURE: 121 MMHG

## 2022-08-31 DIAGNOSIS — Z98.890 OTHER SPECIFIED POSTPROCEDURAL STATES: Chronic | ICD-10-CM

## 2022-08-31 DIAGNOSIS — F98.8 OTHER SPECIFIED BEHAVIORAL AND EMOTIONAL DISORDERS WITH ONSET USUALLY OCCURRING IN CHILDHOOD AND ADOLESCENCE: ICD-10-CM

## 2022-08-31 DIAGNOSIS — J38.3 OTHER DISEASES OF VOCAL CORDS: Chronic | ICD-10-CM

## 2022-08-31 DIAGNOSIS — Z01.419 ENCOUNTER FOR GYNECOLOGICAL EXAMINATION (GENERAL) (ROUTINE) W/OUT ABNORMAL FINDINGS: ICD-10-CM

## 2022-08-31 DIAGNOSIS — K02.9 DENTAL CARIES, UNSPECIFIED: Chronic | ICD-10-CM

## 2022-08-31 NOTE — PHYSICAL EXAM
[Chaperone Present] : A chaperone was present in the examining room during all aspects of the physical examination [No Lymphadenopathy] : no lymphadenopathy [Soft] : soft [Non-tender] : non-tender [Non-distended] : non-distended [Examination Of The Breasts] : a normal appearance [Normal] : normal [No Masses] : no breast masses were palpable

## 2022-08-31 NOTE — HISTORY OF PRESENT ILLNESS
[Patient reported PAP Smear was normal] : Patient reported PAP Smear was normal [Gonorrhea test offered] : Gonorrhea test offered [Chlamydia test offered] : Chlamydia test offered [N] : Patient denies prior pregnancies [TextBox_4] : 26 yo LMP 8/2022 here for annual exam.  Severe autism and Type 2 DM accompanied by 2 medical aides from her group home.  No Gyn issues as per the aides.  Reports regular periods and never been sexually active.  Patient has been receiving paps every 3 years under anesthesia but last one done in 2017.  In 2020, we never received consent for EUA so appt was never made. [PapSmeardate] : 2017 [LMPDate] : 08/2022 [No] : Patient does not have concerns regarding sex [Never active] : never active

## 2022-08-31 NOTE — DISCUSSION/SUMMARY
[FreeTextEntry1] : Gyn exam\par --breast exam performed\par --GC/Chlam done via urine\par --Urine culture sent\par --patient will not tolerate a pelvic exam so pelvic exam completely deferred\par --advised EUA for pelvic exam and pap \par --consent given to aide to bring to group home for consent and then will book appt\par Follow up 1 year

## 2022-09-01 DIAGNOSIS — Z01.419 ENCOUNTER FOR GYNECOLOGICAL EXAMINATION (GENERAL) (ROUTINE) WITHOUT ABNORMAL FINDINGS: ICD-10-CM

## 2022-09-01 DIAGNOSIS — F98.8 OTHER SPECIFIED BEHAVIORAL AND EMOTIONAL DISORDERS WITH ONSET USUALLY OCCURRING IN CHILDHOOD AND ADOLESCENCE: ICD-10-CM

## 2022-09-01 DIAGNOSIS — F84.0 AUTISTIC DISORDER: ICD-10-CM

## 2022-09-01 LAB
C TRACH RRNA SPEC QL NAA+PROBE: SIGNIFICANT CHANGE UP
CULTURE RESULTS: SIGNIFICANT CHANGE UP
N GONORRHOEA RRNA SPEC QL NAA+PROBE: SIGNIFICANT CHANGE UP
SPECIMEN SOURCE: SIGNIFICANT CHANGE UP
SPECIMEN SOURCE: SIGNIFICANT CHANGE UP

## 2023-02-07 NOTE — H&P PST ADULT - [facility] PE MLT D E PC
Acitretin Counseling:  I discussed with the patient the risks of acitretin including but not limited to hair loss, dry lips/skin/eyes, liver damage, hyperlipidemia, depression/suicidal ideation, photosensitivity.  Serious rare side effects can include but are not limited to pancreatitis, pseudotumor cerebri, bony changes, clot formation/stroke/heart attack.  Patient understands that alcohol is contraindicated since it can result in liver toxicity and significantly prolong the elimination of the drug by many years. regular rate and rhythm/no murmur

## 2023-04-25 ENCOUNTER — INPATIENT (INPATIENT)
Facility: HOSPITAL | Age: 28
LOS: 2 days | Discharge: HOME | End: 2023-04-28
Attending: GENERAL ACUTE CARE HOSPITAL | Admitting: GENERAL ACUTE CARE HOSPITAL
Payer: MEDICARE

## 2023-04-25 VITALS
OXYGEN SATURATION: 100 % | WEIGHT: 166.89 LBS | DIASTOLIC BLOOD PRESSURE: 84 MMHG | HEIGHT: 64 IN | TEMPERATURE: 97 F | HEART RATE: 84 BPM | SYSTOLIC BLOOD PRESSURE: 121 MMHG | RESPIRATION RATE: 17 BRPM

## 2023-04-25 DIAGNOSIS — Z98.890 OTHER SPECIFIED POSTPROCEDURAL STATES: Chronic | ICD-10-CM

## 2023-04-25 DIAGNOSIS — K02.9 DENTAL CARIES, UNSPECIFIED: Chronic | ICD-10-CM

## 2023-04-25 DIAGNOSIS — J38.3 OTHER DISEASES OF VOCAL CORDS: Chronic | ICD-10-CM

## 2023-04-25 LAB
ALBUMIN SERPL ELPH-MCNC: 3.3 G/DL — SIGNIFICANT CHANGE UP (ref 3.3–5)
ALP SERPL-CCNC: 76 U/L — SIGNIFICANT CHANGE UP (ref 40–120)
ALT FLD-CCNC: 23 U/L — SIGNIFICANT CHANGE UP (ref 12–78)
ANION GAP SERPL CALC-SCNC: 5 MMOL/L — SIGNIFICANT CHANGE UP (ref 5–17)
AST SERPL-CCNC: 45 U/L — HIGH (ref 15–37)
BASOPHILS # BLD AUTO: 0.02 K/UL — SIGNIFICANT CHANGE UP (ref 0–0.2)
BASOPHILS NFR BLD AUTO: 0.3 % — SIGNIFICANT CHANGE UP (ref 0–2)
BILIRUB SERPL-MCNC: 0.2 MG/DL — SIGNIFICANT CHANGE UP (ref 0.2–1.2)
BUN SERPL-MCNC: 11 MG/DL — SIGNIFICANT CHANGE UP (ref 7–23)
CALCIUM SERPL-MCNC: 9.3 MG/DL — SIGNIFICANT CHANGE UP (ref 8.5–10.1)
CHLORIDE SERPL-SCNC: 103 MMOL/L — SIGNIFICANT CHANGE UP (ref 96–108)
CO2 SERPL-SCNC: 26 MMOL/L — SIGNIFICANT CHANGE UP (ref 22–31)
CREAT SERPL-MCNC: 0.8 MG/DL — SIGNIFICANT CHANGE UP (ref 0.5–1.3)
EGFR: 102 ML/MIN/1.73M2 — SIGNIFICANT CHANGE UP
EOSINOPHIL # BLD AUTO: 0.04 K/UL — SIGNIFICANT CHANGE UP (ref 0–0.5)
EOSINOPHIL NFR BLD AUTO: 0.6 % — SIGNIFICANT CHANGE UP (ref 0–6)
GLUCOSE SERPL-MCNC: 124 MG/DL — HIGH (ref 70–99)
HCG SERPL-ACNC: <1 MIU/ML — SIGNIFICANT CHANGE UP
HCT VFR BLD CALC: 38.5 % — SIGNIFICANT CHANGE UP (ref 34.5–45)
HGB BLD-MCNC: 11.8 G/DL — SIGNIFICANT CHANGE UP (ref 11.5–15.5)
IMM GRANULOCYTES NFR BLD AUTO: 0.2 % — SIGNIFICANT CHANGE UP (ref 0–0.9)
LACTATE SERPL-SCNC: 1.4 MMOL/L — SIGNIFICANT CHANGE UP (ref 0.7–2)
LYMPHOCYTES # BLD AUTO: 2.04 K/UL — SIGNIFICANT CHANGE UP (ref 1–3.3)
LYMPHOCYTES # BLD AUTO: 32.7 % — SIGNIFICANT CHANGE UP (ref 13–44)
MCHC RBC-ENTMCNC: 26.8 PG — LOW (ref 27–34)
MCHC RBC-ENTMCNC: 30.6 G/DL — LOW (ref 32–36)
MCV RBC AUTO: 87.3 FL — SIGNIFICANT CHANGE UP (ref 80–100)
MONOCYTES # BLD AUTO: 0.61 K/UL — SIGNIFICANT CHANGE UP (ref 0–0.9)
MONOCYTES NFR BLD AUTO: 9.8 % — SIGNIFICANT CHANGE UP (ref 2–14)
NEUTROPHILS # BLD AUTO: 3.52 K/UL — SIGNIFICANT CHANGE UP (ref 1.8–7.4)
NEUTROPHILS NFR BLD AUTO: 56.4 % — SIGNIFICANT CHANGE UP (ref 43–77)
NRBC # BLD: 0 /100 WBCS — SIGNIFICANT CHANGE UP (ref 0–0)
PLATELET # BLD AUTO: 199 K/UL — SIGNIFICANT CHANGE UP (ref 150–400)
POTASSIUM SERPL-MCNC: 4.7 MMOL/L — SIGNIFICANT CHANGE UP (ref 3.5–5.3)
POTASSIUM SERPL-SCNC: 4.7 MMOL/L — SIGNIFICANT CHANGE UP (ref 3.5–5.3)
PROT SERPL-MCNC: 8.3 GM/DL — SIGNIFICANT CHANGE UP (ref 6–8.3)
RBC # BLD: 4.41 M/UL — SIGNIFICANT CHANGE UP (ref 3.8–5.2)
RBC # FLD: 18.1 % — HIGH (ref 10.3–14.5)
SODIUM SERPL-SCNC: 134 MMOL/L — LOW (ref 135–145)
VALPROATE SERPL-MCNC: 79 UG/ML — SIGNIFICANT CHANGE UP (ref 50–100)
WBC # BLD: 6.24 K/UL — SIGNIFICANT CHANGE UP (ref 3.8–10.5)
WBC # FLD AUTO: 6.24 K/UL — SIGNIFICANT CHANGE UP (ref 3.8–10.5)

## 2023-04-25 PROCEDURE — 99222 1ST HOSP IP/OBS MODERATE 55: CPT

## 2023-04-25 PROCEDURE — 70450 CT HEAD/BRAIN W/O DYE: CPT | Mod: 26,MA

## 2023-04-25 PROCEDURE — 99285 EMERGENCY DEPT VISIT HI MDM: CPT

## 2023-04-25 PROCEDURE — 93010 ELECTROCARDIOGRAM REPORT: CPT

## 2023-04-25 RX ORDER — DEXTROSE 50 % IN WATER 50 %
15 SYRINGE (ML) INTRAVENOUS ONCE
Refills: 0 | Status: DISCONTINUED | OUTPATIENT
Start: 2023-04-25 | End: 2023-04-28

## 2023-04-25 RX ORDER — FLUOXETINE HCL 10 MG
10 CAPSULE ORAL DAILY
Refills: 0 | Status: DISCONTINUED | OUTPATIENT
Start: 2023-04-25 | End: 2023-04-28

## 2023-04-25 RX ORDER — LANOLIN ALCOHOL/MO/W.PET/CERES
3 CREAM (GRAM) TOPICAL AT BEDTIME
Refills: 0 | Status: DISCONTINUED | OUTPATIENT
Start: 2023-04-25 | End: 2023-04-28

## 2023-04-25 RX ORDER — SODIUM CHLORIDE 9 MG/ML
1000 INJECTION INTRAMUSCULAR; INTRAVENOUS; SUBCUTANEOUS ONCE
Refills: 0 | Status: COMPLETED | OUTPATIENT
Start: 2023-04-25 | End: 2023-04-25

## 2023-04-25 RX ORDER — DEXTROSE 50 % IN WATER 50 %
25 SYRINGE (ML) INTRAVENOUS ONCE
Refills: 0 | Status: DISCONTINUED | OUTPATIENT
Start: 2023-04-25 | End: 2023-04-28

## 2023-04-25 RX ORDER — SODIUM CHLORIDE 9 MG/ML
1000 INJECTION, SOLUTION INTRAVENOUS
Refills: 0 | Status: DISCONTINUED | OUTPATIENT
Start: 2023-04-25 | End: 2023-04-28

## 2023-04-25 RX ORDER — VALPROIC ACID (AS SODIUM SALT) 250 MG/5ML
500 SOLUTION, ORAL ORAL ONCE
Refills: 0 | Status: DISCONTINUED | OUTPATIENT
Start: 2023-04-25 | End: 2023-04-25

## 2023-04-25 RX ORDER — INSULIN LISPRO 100/ML
VIAL (ML) SUBCUTANEOUS AT BEDTIME
Refills: 0 | Status: DISCONTINUED | OUTPATIENT
Start: 2023-04-25 | End: 2023-04-26

## 2023-04-25 RX ORDER — DIVALPROEX SODIUM 500 MG/1
1000 TABLET, DELAYED RELEASE ORAL AT BEDTIME
Refills: 0 | Status: DISCONTINUED | OUTPATIENT
Start: 2023-04-26 | End: 2023-04-28

## 2023-04-25 RX ORDER — GABAPENTIN 400 MG/1
300 CAPSULE ORAL
Refills: 0 | Status: DISCONTINUED | OUTPATIENT
Start: 2023-04-25 | End: 2023-04-28

## 2023-04-25 RX ORDER — ONDANSETRON 8 MG/1
4 TABLET, FILM COATED ORAL EVERY 8 HOURS
Refills: 0 | Status: DISCONTINUED | OUTPATIENT
Start: 2023-04-25 | End: 2023-04-28

## 2023-04-25 RX ORDER — QUETIAPINE FUMARATE 200 MG/1
200 TABLET, FILM COATED ORAL
Refills: 0 | Status: DISCONTINUED | OUTPATIENT
Start: 2023-04-25 | End: 2023-04-28

## 2023-04-25 RX ORDER — DEXTROSE 50 % IN WATER 50 %
12.5 SYRINGE (ML) INTRAVENOUS ONCE
Refills: 0 | Status: DISCONTINUED | OUTPATIENT
Start: 2023-04-25 | End: 2023-04-28

## 2023-04-25 RX ORDER — GLUCAGON INJECTION, SOLUTION 0.5 MG/.1ML
1 INJECTION, SOLUTION SUBCUTANEOUS ONCE
Refills: 0 | Status: DISCONTINUED | OUTPATIENT
Start: 2023-04-25 | End: 2023-04-28

## 2023-04-25 RX ORDER — LURASIDONE HYDROCHLORIDE 40 MG/1
120 TABLET ORAL DAILY
Refills: 0 | Status: DISCONTINUED | OUTPATIENT
Start: 2023-04-25 | End: 2023-04-28

## 2023-04-25 RX ORDER — INSULIN LISPRO 100/ML
VIAL (ML) SUBCUTANEOUS
Refills: 0 | Status: DISCONTINUED | OUTPATIENT
Start: 2023-04-25 | End: 2023-04-26

## 2023-04-25 RX ORDER — CLONAZEPAM 1 MG
1 TABLET ORAL
Refills: 0 | Status: DISCONTINUED | OUTPATIENT
Start: 2023-04-25 | End: 2023-04-28

## 2023-04-25 RX ORDER — VALPROIC ACID (AS SODIUM SALT) 250 MG/5ML
1000 SOLUTION, ORAL ORAL ONCE
Refills: 0 | Status: COMPLETED | OUTPATIENT
Start: 2023-04-25 | End: 2023-04-25

## 2023-04-25 RX ORDER — ACETAMINOPHEN 500 MG
650 TABLET ORAL EVERY 6 HOURS
Refills: 0 | Status: DISCONTINUED | OUTPATIENT
Start: 2023-04-25 | End: 2023-04-28

## 2023-04-25 RX ORDER — DIVALPROEX SODIUM 500 MG/1
500 TABLET, DELAYED RELEASE ORAL ONCE
Refills: 0 | Status: DISCONTINUED | OUTPATIENT
Start: 2023-04-25 | End: 2023-04-25

## 2023-04-25 RX ADMIN — SODIUM CHLORIDE 1000 MILLILITER(S): 9 INJECTION INTRAMUSCULAR; INTRAVENOUS; SUBCUTANEOUS at 19:01

## 2023-04-25 NOTE — ED PROVIDER NOTE - CLINICAL SUMMARY MEDICAL DECISION MAKING FREE TEXT BOX
Multiple seizures at home.  Patient with no appreciable neuro deficits but staff notes patient more tired now, received IM benzos so post ictal vs sedated.  Will continue to monitor.  Plan for labs, ecg, reassess.

## 2023-04-25 NOTE — H&P ADULT - ASSESSMENT
26 y/o F w/ PMHx of Cerebral Palsy (per caretaker, pt signs and does say a few words at baseline), Autism, DM type 2, Pseudoseizures presents to the ED s/p seizures, pt being admitted for further evaluation    # Seizures  - Valproic acid level wnl therapeutic range  - seizure precautions  - c/w Depakote  - CT head neg  - Pt w/ srinivas in spine, unable to get an MRI per caretaker  - EEG in am  - Neuro consult in am; pls call    # Abdominal distension  # gastroenteritis  - f/u KUB    # Autism  # Depression  # CP  - c/w Klonopin, fluoxetine, lurasidone    # DM   - FS qAC and HS w/ SSI  - f/u A1c    # DVT ppx - low risk, SCDs

## 2023-04-25 NOTE — ED PROVIDER NOTE - OBJECTIVE STATEMENT
28yo female with pmh autism, cp, dm, seizures, presenting with seizures.  Brought in by staff who noted earlier today patient was vomiting and had diarrhea.  Shortly after had gtc seizure lasting a several seconds and was unresponsive, not awaking to sternal rub.  This happened 2 more times prior to ems arrival.  After ems arrival, patient had 2 more seizures en route and given IM versed 5mg x2.  Staff denies any trauma, caught patient before falling to ground.  Patient was otherwise in normal state of health prior.  No fevers.  Of note, patient has had pseudoseizures in the past but staff who is with patient often has seen these and episodes today seemed much less consistent with previous pseudoseizures.

## 2023-04-25 NOTE — ED ADULT NURSE NOTE - NSIMPLEMENTINTERV_GEN_ALL_ED
Implemented All Universal Safety Interventions:  Enigma to call system. Call bell, personal items and telephone within reach. Instruct patient to call for assistance. Room bathroom lighting operational. Non-slip footwear when patient is off stretcher. Physically safe environment: no spills, clutter or unnecessary equipment. Stretcher in lowest position, wheels locked, appropriate side rails in place.

## 2023-04-25 NOTE — H&P ADULT - NSHPPHYSICALEXAM_GEN_ALL_CORE
PHYSICAL EXAM:    Vital Signs Last 24 Hrs  T(C): 36.1 (25 Apr 2023 18:21), Max: 36.1 (25 Apr 2023 18:21)  T(F): 97 (25 Apr 2023 18:21), Max: 97 (25 Apr 2023 18:21)  HR: 70 (25 Apr 2023 19:18) (70 - 84)  BP: 129/83 (25 Apr 2023 19:18) (121/84 - 129/83)  BP(mean): --  RR: 17 (25 Apr 2023 19:18) (17 - 17)  SpO2: 99% (25 Apr 2023 19:18) (99% - 100%)    Parameters below as of 25 Apr 2023 18:21  Patient On (Oxygen Delivery Method): room air      GENERAL: Pt lying in bed comfortably in NAD  HEENT:  Atraumatic, EOMI, PERRL, conjunctiva and sclera clear, MMM  NECK: Supple  CHEST/LUNG: Clear to auscultation bilaterally  HEART: Regular rate and rhythm  ABDOMEN: Bowel sounds present; Soft, tender, distended. No guarding or rigidity    EXTREMITIES:  2+ Peripheral Pulses, brisk capillary refill. No edema  NEUROLOGICAL:  Awake, nodded a few times, smiles, CP   MSK: no overt deformity  SKIN: warm, dry

## 2023-04-25 NOTE — ED ADULT TRIAGE NOTE - CHIEF COMPLAINT QUOTE
Patient is from [facility] group home after having 3 seizures at the home and 2 with EMS. Patient was given 10 of versed 10mg IM.  in triage   hx seizures

## 2023-04-25 NOTE — ED PROVIDER NOTE - PHYSICAL EXAMINATION
General appearance: Nontoxic appearing, calm, follows commands, afebrile    Eyes: anicteric sclerae, MEGHANN, EOMI   HENT: Atraumatic; oropharynx clear, MMM and no ulcerations, no pharyngeal erythema or exudate   Neck: Trachea midline; Full range of motion, supple   Pulm: CTA bl, normal respiratory effort and no intercostal retractions, normal work of breathing   CV: RRR, No murmurs, rubs, or gallops   Abdomen: Soft, non-tender, non-distended; no guarding or rebound   Extremities: No peripheral edema, no gross deformities, FROM x4, 5/5 MS x4, gross sensation intact    Skin: Dry, normal temperature, turgor and texture; no rash

## 2023-04-25 NOTE — PHARMACOTHERAPY INTERVENTION NOTE - COMMENTS
Recommended valproic acid syrup 500mg x 1 dose in place of Depakote 500mg ER tablet due to pt having difficulty swallowing as per RN. Physician would like to explore other alternatives and pt will be monitored.

## 2023-04-25 NOTE — ED ADULT NURSE NOTE - OBJECTIVE STATEMENT
per health aid pt experienced x3 witnessed seizures at the residents lasting 10 seconds each and x2 seizures lasting 15 seconds each in the ambulance on the way to the [facility] ED. pt is nauseas and has no hx of falls.s

## 2023-04-25 NOTE — H&P ADULT - NSHPLABSRESULTS_GEN_ALL_CORE
T(C): 36.1 (04-25-23 @ 18:21), Max: 36.1 (04-25-23 @ 18:21)  HR: 70 (04-25-23 @ 19:18) (70 - 84)  BP: 129/83 (04-25-23 @ 19:18) (121/84 - 129/83)  RR: 17 (04-25-23 @ 19:18) (17 - 17)  SpO2: 99% (04-25-23 @ 19:18) (99% - 100%)                        11.8   6.24  )-----------( 199      ( 25 Apr 2023 18:56 )             38.5     04-25    134<L>  |  103  |  11  ----------------------------<  124<H>  4.7   |  26  |  0.80    Ca    9.3      25 Apr 2023 18:56    TPro  8.3  /  Alb  3.3  /  TBili  0.2  /  DBili  x   /  AST  45<H>  /  ALT  23  /  AlkPhos  76  04-25    LIVER FUNCTIONS - ( 25 Apr 2023 18:56 )  Alb: 3.3 g/dL / Pro: 8.3 gm/dL / ALK PHOS: 76 U/L / ALT: 23 U/L / AST: 45 U/L / GGT: x             < from: CT Head No Cont (04.25.23 @ 21:48) >    IMPRESSION:  No large territory acute infarct, intracranial hemorrhage, or mass   effect. Stable exam compared to November 2009.    MRI is more sensitive for evaluation of postictal change and/or   epileptogenic foci.      < end of copied text >      acetaminophen     Tablet .. 650 milliGRAM(s) Oral every 6 hours PRN  aluminum hydroxide/magnesium hydroxide/simethicone Suspension 30 milliLiter(s) Oral every 4 hours PRN  clonazePAM  Tablet 1 milliGRAM(s) Oral two times a day  dextrose 5%. 1000 milliLiter(s) IV Continuous <Continuous>  dextrose 5%. 1000 milliLiter(s) IV Continuous <Continuous>  dextrose 50% Injectable 25 Gram(s) IV Push once  dextrose 50% Injectable 25 Gram(s) IV Push once  dextrose 50% Injectable 12.5 Gram(s) IV Push once  dextrose Oral Gel 15 Gram(s) Oral once PRN  FLUoxetine Solution 10 milliGRAM(s) Oral daily  gabapentin 300 milliGRAM(s) Oral four times a day  glucagon  Injectable 1 milliGRAM(s) IntraMuscular once  insulin lispro (ADMELOG) corrective regimen sliding scale   SubCutaneous three times a day before meals  insulin lispro (ADMELOG) corrective regimen sliding scale   SubCutaneous at bedtime  lurasidone 120 milliGRAM(s) Oral daily  melatonin 3 milliGRAM(s) Oral at bedtime PRN  ondansetron Injectable 4 milliGRAM(s) IV Push every 8 hours PRN  QUEtiapine 200 milliGRAM(s) Oral four times a day  valproic  acid Syrup 1000 milliGRAM(s) Oral once

## 2023-04-25 NOTE — H&P ADULT - HISTORY OF PRESENT ILLNESS
26 y/o F w/ PMHx of Cerebral Palsy (per caretaker, pt signs and does say a few words at baseline), Autism, DM type 2, Pseudoseizures presents to the ED s/p seizures. Caretaker reports pt had 3 seizures (similar to her pseudoseizures); diffuse shaking lasting 10 - 15 secs in a 30 mins span. Pt hasn't had a seizure in years thus EMS was called. Per EMS pt had 2 seizures which aid reports she was told pt had a decerebrate posture and her eyes where rolled back which is not typical. Of note per caretaker, after pt came home from schools she began c/o abdominal pain, nausea, NB/NB vomiting and NB diarrhea. Pt given Versed by EMS.    ED Course  Vitals stable  Labs stable. Valproic acid level 79  CT head neg  Pt given Valproic syrup in ED

## 2023-04-25 NOTE — ED ADULT NURSE NOTE - BREATH SOUNDS, LEFT
[FreeTextEntry1] : 35 yo male with pmhx as below is here for an initial eval\par recent visit to ed with CP, pressure like, prolonged episode\par first set negative, was advised to be admitted to obs and ccta, but signed out AMA\par no other cvs complains\par strong fhx of cad\par + obesity\par ros is otherwise unrem clear

## 2023-04-26 LAB
A1C WITH ESTIMATED AVERAGE GLUCOSE RESULT: 5.9 % — HIGH (ref 4–5.6)
ANION GAP SERPL CALC-SCNC: 4 MMOL/L — LOW (ref 5–17)
APPEARANCE UR: CLEAR — SIGNIFICANT CHANGE UP
BACTERIA # UR AUTO: ABNORMAL
BILIRUB UR-MCNC: NEGATIVE — SIGNIFICANT CHANGE UP
BUN SERPL-MCNC: 8 MG/DL — SIGNIFICANT CHANGE UP (ref 7–23)
CALCIUM SERPL-MCNC: 8.6 MG/DL — SIGNIFICANT CHANGE UP (ref 8.5–10.1)
CHLORIDE SERPL-SCNC: 107 MMOL/L — SIGNIFICANT CHANGE UP (ref 96–108)
CO2 SERPL-SCNC: 25 MMOL/L — SIGNIFICANT CHANGE UP (ref 22–31)
COLOR SPEC: YELLOW — SIGNIFICANT CHANGE UP
CREAT SERPL-MCNC: 0.6 MG/DL — SIGNIFICANT CHANGE UP (ref 0.5–1.3)
DIFF PNL FLD: NEGATIVE — SIGNIFICANT CHANGE UP
EGFR: 125 ML/MIN/1.73M2 — SIGNIFICANT CHANGE UP
EPI CELLS # UR: SIGNIFICANT CHANGE UP
ESTIMATED AVERAGE GLUCOSE: 123 MG/DL — HIGH (ref 68–114)
GLUCOSE BLDC GLUCOMTR-MCNC: 78 MG/DL — SIGNIFICANT CHANGE UP (ref 70–99)
GLUCOSE BLDC GLUCOMTR-MCNC: 83 MG/DL — SIGNIFICANT CHANGE UP (ref 70–99)
GLUCOSE BLDC GLUCOMTR-MCNC: 84 MG/DL — SIGNIFICANT CHANGE UP (ref 70–99)
GLUCOSE BLDC GLUCOMTR-MCNC: 86 MG/DL — SIGNIFICANT CHANGE UP (ref 70–99)
GLUCOSE SERPL-MCNC: 85 MG/DL — SIGNIFICANT CHANGE UP (ref 70–99)
GLUCOSE UR QL: NEGATIVE MG/DL — SIGNIFICANT CHANGE UP
HCT VFR BLD CALC: 35.2 % — SIGNIFICANT CHANGE UP (ref 34.5–45)
HGB BLD-MCNC: 11 G/DL — LOW (ref 11.5–15.5)
KETONES UR-MCNC: ABNORMAL
LEUKOCYTE ESTERASE UR-ACNC: NEGATIVE — SIGNIFICANT CHANGE UP
MCHC RBC-ENTMCNC: 26.9 PG — LOW (ref 27–34)
MCHC RBC-ENTMCNC: 31.3 G/DL — LOW (ref 32–36)
MCV RBC AUTO: 86.1 FL — SIGNIFICANT CHANGE UP (ref 80–100)
NITRITE UR-MCNC: NEGATIVE — SIGNIFICANT CHANGE UP
NRBC # BLD: 0 /100 WBCS — SIGNIFICANT CHANGE UP (ref 0–0)
PH UR: 7 — SIGNIFICANT CHANGE UP (ref 5–8)
PLATELET # BLD AUTO: 196 K/UL — SIGNIFICANT CHANGE UP (ref 150–400)
POTASSIUM SERPL-MCNC: 3.9 MMOL/L — SIGNIFICANT CHANGE UP (ref 3.5–5.3)
POTASSIUM SERPL-SCNC: 3.9 MMOL/L — SIGNIFICANT CHANGE UP (ref 3.5–5.3)
PROT UR-MCNC: 15 MG/DL
RBC # BLD: 4.09 M/UL — SIGNIFICANT CHANGE UP (ref 3.8–5.2)
RBC # FLD: 18 % — HIGH (ref 10.3–14.5)
RBC CASTS # UR COMP ASSIST: SIGNIFICANT CHANGE UP /HPF (ref 0–4)
SODIUM SERPL-SCNC: 136 MMOL/L — SIGNIFICANT CHANGE UP (ref 135–145)
SP GR SPEC: 1.01 — SIGNIFICANT CHANGE UP (ref 1.01–1.02)
UROBILINOGEN FLD QL: 1 MG/DL
WBC # BLD: 4.69 K/UL — SIGNIFICANT CHANGE UP (ref 3.8–10.5)
WBC # FLD AUTO: 4.69 K/UL — SIGNIFICANT CHANGE UP (ref 3.8–10.5)
WBC UR QL: SIGNIFICANT CHANGE UP

## 2023-04-26 PROCEDURE — 74018 RADEX ABDOMEN 1 VIEW: CPT | Mod: 26

## 2023-04-26 PROCEDURE — 99232 SBSQ HOSP IP/OBS MODERATE 35: CPT

## 2023-04-26 RX ORDER — SENNA PLUS 8.6 MG/1
2 TABLET ORAL AT BEDTIME
Refills: 0 | Status: DISCONTINUED | OUTPATIENT
Start: 2023-04-26 | End: 2023-04-28

## 2023-04-26 RX ORDER — METFORMIN HYDROCHLORIDE 850 MG/1
1 TABLET ORAL
Qty: 0 | Refills: 0 | DISCHARGE

## 2023-04-26 RX ORDER — LACTULOSE 10 G/15ML
30 SOLUTION ORAL
Qty: 0 | Refills: 0 | DISCHARGE

## 2023-04-26 RX ORDER — LURASIDONE HYDROCHLORIDE 40 MG/1
1 TABLET ORAL
Qty: 0 | Refills: 0 | DISCHARGE

## 2023-04-26 RX ORDER — CLINDAMYCIN PHOSPHATE GEL USP, 1% 10 MG/G
1 GEL TOPICAL
Qty: 0 | Refills: 0 | DISCHARGE

## 2023-04-26 RX ORDER — CLONAZEPAM 1 MG
0 TABLET ORAL
Qty: 0 | Refills: 0 | DISCHARGE

## 2023-04-26 RX ORDER — ENOXAPARIN SODIUM 100 MG/ML
40 INJECTION SUBCUTANEOUS EVERY 24 HOURS
Refills: 0 | Status: DISCONTINUED | OUTPATIENT
Start: 2023-04-26 | End: 2023-04-28

## 2023-04-26 RX ORDER — CHOLECALCIFEROL (VITAMIN D3) 125 MCG
1 CAPSULE ORAL
Qty: 0 | Refills: 0 | DISCHARGE

## 2023-04-26 RX ORDER — FLUOXETINE HCL 10 MG
0 CAPSULE ORAL
Qty: 0 | Refills: 0 | DISCHARGE

## 2023-04-26 RX ORDER — EPINEPHRINE 0.3 MG/.3ML
0 INJECTION INTRAMUSCULAR; SUBCUTANEOUS
Qty: 0 | Refills: 0 | DISCHARGE

## 2023-04-26 RX ADMIN — QUETIAPINE FUMARATE 200 MILLIGRAM(S): 200 TABLET, FILM COATED ORAL at 11:18

## 2023-04-26 RX ADMIN — GABAPENTIN 300 MILLIGRAM(S): 400 CAPSULE ORAL at 11:19

## 2023-04-26 RX ADMIN — GABAPENTIN 300 MILLIGRAM(S): 400 CAPSULE ORAL at 06:12

## 2023-04-26 RX ADMIN — Medication 1000 MILLIGRAM(S): at 01:05

## 2023-04-26 RX ADMIN — GABAPENTIN 300 MILLIGRAM(S): 400 CAPSULE ORAL at 01:05

## 2023-04-26 RX ADMIN — QUETIAPINE FUMARATE 200 MILLIGRAM(S): 200 TABLET, FILM COATED ORAL at 06:12

## 2023-04-26 RX ADMIN — QUETIAPINE FUMARATE 200 MILLIGRAM(S): 200 TABLET, FILM COATED ORAL at 17:13

## 2023-04-26 RX ADMIN — Medication 10 MILLIGRAM(S): at 11:18

## 2023-04-26 RX ADMIN — Medication 1 MILLIGRAM(S): at 17:13

## 2023-04-26 RX ADMIN — QUETIAPINE FUMARATE 200 MILLIGRAM(S): 200 TABLET, FILM COATED ORAL at 01:05

## 2023-04-26 RX ADMIN — GABAPENTIN 300 MILLIGRAM(S): 400 CAPSULE ORAL at 17:13

## 2023-04-26 RX ADMIN — DIVALPROEX SODIUM 1000 MILLIGRAM(S): 500 TABLET, DELAYED RELEASE ORAL at 21:54

## 2023-04-26 RX ADMIN — SENNA PLUS 2 TABLET(S): 8.6 TABLET ORAL at 21:55

## 2023-04-26 RX ADMIN — Medication 0: at 01:18

## 2023-04-26 RX ADMIN — LURASIDONE HYDROCHLORIDE 120 MILLIGRAM(S): 40 TABLET ORAL at 11:18

## 2023-04-26 RX ADMIN — Medication 1 MILLIGRAM(S): at 06:13

## 2023-04-26 NOTE — PATIENT PROFILE ADULT - FALL HARM RISK - HARM RISK INTERVENTIONS
Assistance with ambulation/Assistance OOB with selected safe patient handling equipment/Communicate Risk of Fall with Harm to all staff/Discuss with provider need for PT consult/Monitor gait and stability/Reinforce activity limits and safety measures with patient and family/Tailored Fall Risk Interventions/Visual Cue: Yellow wristband and red socks/Bed in lowest position, wheels locked, appropriate side rails in place/Call bell, personal items and telephone in reach/Instruct patient to call for assistance before getting out of bed or chair/Non-slip footwear when patient is out of bed/[place] to call system/Physically safe environment - no spills, clutter or unnecessary equipment/Purposeful Proactive Rounding/Room/bathroom lighting operational, light cord in reach

## 2023-04-26 NOTE — CONSULT NOTE ADULT - SUBJECTIVE AND OBJECTIVE BOX
Neurology consult    TIERNEY [id number]     Patient is a 27y old  Female who presents with a chief complaint of Seizure (2023 23:28)      HPI:       26 y/o F w/ PMHx of Cerebral Palsy (per caretaker, pt signs and does say a few words at baseline), Autism, DM type 2, Pseudoseizures presents to the ED s/p seizures. Caretaker reports pt had 3 seizures (similar to her pseudoseizures); diffuse shaking lasting 10 - 15 secs in a 30 mins span. Pt hasn't had a seizure in years thus EMS was called. Per EMS pt had 2 seizures which aid reports she was told pt had a decerebrate posture and her eyes where rolled back which is not typical. Of note per caretaker, after pt came home from schools she began c/o abdominal pain, nausea, NB/NB vomiting and NB diarrhea. Pt given Versed by EMS.    ED Course  Vitals stable  Labs stable. Valproic acid level 79  CT head neg  Pt given Valproic syrup in ED (2023 23:28)      MEDICATIONS    acetaminophen     Tablet .. 650 milliGRAM(s) Oral every 6 hours PRN  aluminum hydroxide/magnesium hydroxide/simethicone Suspension 30 milliLiter(s) Oral every 4 hours PRN  clonazePAM  Tablet 1 milliGRAM(s) Oral two times a day  dextrose 5%. 1000 milliLiter(s) IV Continuous <Continuous>  dextrose 5%. 1000 milliLiter(s) IV Continuous <Continuous>  dextrose 50% Injectable 25 Gram(s) IV Push once  dextrose 50% Injectable 25 Gram(s) IV Push once  dextrose 50% Injectable 12.5 Gram(s) IV Push once  dextrose Oral Gel 15 Gram(s) Oral once PRN  divalproex ER 1000 milliGRAM(s) Oral at bedtime  FLUoxetine Solution 10 milliGRAM(s) Oral daily  gabapentin 300 milliGRAM(s) Oral four times a day  glucagon  Injectable 1 milliGRAM(s) IntraMuscular once  insulin lispro (ADMELOG) corrective regimen sliding scale   SubCutaneous at bedtime  insulin lispro (ADMELOG) corrective regimen sliding scale   SubCutaneous three times a day before meals  lurasidone 120 milliGRAM(s) Oral daily  melatonin 3 milliGRAM(s) Oral at bedtime PRN  ondansetron Injectable 4 milliGRAM(s) IV Push every 8 hours PRN  QUEtiapine 200 milliGRAM(s) Oral four times a day      PMH: Mentally challenged    Diabetes    Autism    Impulse disorder    Depression         PSH: No significant past surgical history    Vocal cord disease    History of lumbar laminectomy    Dental caries        Family history: No history of dementia, strokes, or seizures   FAMILY HISTORY:  No pertinent family history in first degree relatives        SOCIAL HISTORY:  No history of tobacco or alcohol use     Allergies    shellfish (Anaphylaxis)  penicillin (Unknown)    Intolerances        Height (cm): 162.6 ( @ 18:21)  Weight (kg): 75.7 ( @ 18:21)  BMI (kg/m2): 28.6 ( @ 18:21)    Vital Signs Last 24 Hrs  T(C): 36.6 (2023 10:34), Max: 36.9 (2023 05:40)  T(F): 97.9 (2023 10:34), Max: 98.4 (2023 05:40)  HR: 81 (2023 10:34) (70 - 86)  BP: 107/73 (2023 10:34) (107/73 - 136/84)  BP(mean): --  RR: 17 (2023 10:34) (17 - 18)  SpO2: 100% (2023 10:34) (97% - 100%)    Parameters below as of 2023 10:34  Patient On (Oxygen Delivery Method): room air          On Neurological Examination:    Mental Status - Patient is alert, awake, follows simple commands no verbal oupt    Cranial Nerves - PERRL, extraocular movements intact with exotropia, VFF, V1-V3 intact, no gross facial asymmetry,    Motor Exam -   increased tone moves all ext appears to move right side more readily   Sensory    Intact to light touch and pinprick bilaterally    Coord: FTN intact bilaterally     Gait -deferred                                                 LABS:  CBC Full  -  ( 2023 07:35 )  WBC Count : 4.69 K/uL  RBC Count : 4.09 M/uL  Hemoglobin : 11.0 g/dL  Hematocrit : 35.2 %  Platelet Count - Automated : 196 K/uL  Mean Cell Volume : 86.1 fl  Mean Cell Hemoglobin : 26.9 pg  Mean Cell Hemoglobin Concentration : 31.3 g/dL  Auto Neutrophil # : x  Auto Lymphocyte # : x  Auto Monocyte # : x  Auto Eosinophil # : x  Auto Basophil # : x  Auto Neutrophil % : x  Auto Lymphocyte % : x  Auto Monocyte % : x  Auto Eosinophil % : x  Auto Basophil % : x    Urinalysis Basic - ( 2023 00:09 )    Color: Yellow / Appearance: Clear / S.010 / pH: x  Gluc: x / Ketone: Trace  / Bili: Negative / Urobili: 1 mg/dL   Blood: x / Protein: 15 mg/dL / Nitrite: Negative   Leuk Esterase: Negative / RBC: 0-2 /HPF / WBC 0-2   Sq Epi: x / Non Sq Epi: x / Bacteria: Few          136  |  107  |  8   ----------------------------<  85  3.9   |  25  |  0.60    Ca    8.6      2023 07:35    TPro  8.3  /  Alb  3.3  /  TBili  0.2  /  DBili  x   /  AST  45<H>  /  ALT  23  /  AlkPhos  76  25    LIVER FUNCTIONS - ( 2023 18:56 )  Alb: 3.3 g/dL / Pro: 8.3 gm/dL / ALK PHOS: 76 U/L / ALT: 23 U/L / AST: 45 U/L / GGT: x           Hemoglobin A1C:             RADIOLOGY  < from: CT Head No Cont (23 @ 21:48) >  IMPRESSION:  No large territory acute infarct, intracranial hemorrhage, or mass   effect. Stable exam compared to 2009.    MRI is more sensitive for evaluation of postictal change and/or   epileptogenic foci.    --- End of Report ---      < end of copied text >

## 2023-04-26 NOTE — CONSULT NOTE ADULT - ASSESSMENT
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 Caretaker reports pt had 3 seizures (similar to her pseudoseizures); diffuse shaking lasting 10 - 15 secs in a 30 mins space PE as above no verbal outptu increased tone moves right more than left  CTH stable chronic left cerebelar atrophy  lactate upon presentation was 1.4    Impression: pseudoseizure vs seizure     extended EEG if possible  VPA level 79  On Depakote ER 1000 mg qhs  
Bioethics analysis:     The concern in this case is the protection of patient autonomy which could be in conflict with beneficence and non-maleficence as Ms. Head is developmentally disabled and without capacity. In this case her autonomy is represented by [facility] as well as her mother, Ashly. The question being posed by the medical team is who has the authority to render informed consent for [facility] non-emergent interventions if needed and if the patient is unable to due to lack of capacity related to intellectual and developmental disabilities.     This case also revolves around the practitioners’ beneficence and nonmaleficence. Beneficence calls on practitioners to promote the best possible health or quality of living with aggressive interventions when these help prolong life with “good quality.” Also, to provide comfort care when cure and remission are not possible, and the aim is to achieve the best “quality of dying.” The same is true for the clinician’s duty to nonmaleficence, avoiding medical interventions whose risk and burden exceed their benefit. Treatment considerations should be weighed in light of best interests, benefits, burdens and risk to the patient’s authentic self. In this case, it is helpful to look at potential proposed goals of care and not just procedures.    RECOMMENDATION    Provider virtue poses the legitimate question of whether Ashly Head is the appropriate decision maker or the OPWDD. In this case since Advance Directives and/or End of Life Discussions are not in part of her plan of care therefore any non-emergent informed consent decisions can be made by the patient’s mother Ashly.       IF there is any discussion of Advance Directives, or further decision-making regarding end of life decisions, then [facility] [employer] [facility] would be the appropriate agency to approach. If it should come to this though, please consider Palliative Consult as well as reconsulting Ethics Consultation.         Thank you for this challenging consult.     Cassie Garcia D.Min., ISACCN-RN, KUNAL-C  Medical Ethics  [phone]    CASE REPORT WRITTEN BY RICKIE BOWLES, ISACCN-RN, KUNAL-C     MORE THAN 50% OF THE TIME OF THIS CONSULTATION WAS SPENT IN COORDINATION OF CARE OF PATIENT

## 2023-04-26 NOTE — CONSULT NOTE ADULT - CONSULT REASON
seizure
27 year old female I/DD and from a group home. Ethics assistance for status and surrogate decision maker.

## 2023-04-26 NOTE — PROGRESS NOTE ADULT - ASSESSMENT
28 y/o F w/ PMHx of Cerebral Palsy (per caretaker, pt signs and does say a few words at baseline), Autism, DM type 2, Pseudoseizures presents to the ED s/p seizures, pt being admitted for further evaluation    # Seizures  - Valproic acid level wnl therapeutic range  - seizure precautions  - c/w Depakote  - CT head neg  - Neuro consult appreciated   continuous EEG     # Abdominal distension, resolved   - KUB--neg     # Autism  # Depression  # CP  - c/w Klonopin, fluoxetine, lurasidone    # DM   A1c 5.9%  avoid hypoglycemia   stop ISS       # DVT ppx - lovenox SQ  aspiration, seizure, fall precautions   HOBE

## 2023-04-26 NOTE — CONSULT NOTE ADULT - SUBJECTIVE AND OBJECTIVE BOX
Full Consult to follow    Cassie Garcia D.Min., RN-BSN, [facility]  Medical Ethics  [phone] CLINICAL SUMMARY:     27 year old female status post seizures, Past medical history of cerebral palsy, autism, diabetes type 2, limited verbal ability, uses sign language. Per caretaker present, patient had 3 seizures, lasting 10-15 seconds each in a 30 minute timeframe.  4/25/2023 CT. Scan of head showed no changes and recommended MRI.   Neurology Consult in process. Recommended EEG, pending results.     Ethics requested due to patient residing in group home and to determine her OPWDD status as well as surrogate decision maker.     Prognosis Estimate (survival in hrs, days, wks, mos, yrs): years  Patient Decision-Making Capacity: No capacity due to cognitive disability  Patient Aware of:  Diagnosis:  unknown   Prognosis:   unknown    Name of medical decision-maker should patient lack capacity (relationship):  Ashly Head, Mother [phone]  Other Decision-Maker (i.e., HCA or Surrogate) Aware of:  Diagnosis     Prognosis:    Other Stake-Holders:  [facility] [phone]  Evidence of Patient’s Preference of Life-Sustaining Treatment (Written or Oral) None  Resuscitation status:   No      DNI: No        DISCUSSIONS:  4/27/2023- 0900- 0999spoke with Briana at [phone], Nurse Director of the [facility]. Patient has a mother Ashly who is very involved in managing the patient’s care and informed consent medical decisions.      [id number]- went to patient’s bedside. Patient is non-verbal and even though she responded to her name,she is unable to engage in any fruitful conversation.      1150- left message for mother Ashly.

## 2023-04-27 LAB
ANION GAP SERPL CALC-SCNC: 2 MMOL/L — LOW (ref 5–17)
BUN SERPL-MCNC: 8 MG/DL — SIGNIFICANT CHANGE UP (ref 7–23)
CALCIUM SERPL-MCNC: 9.5 MG/DL — SIGNIFICANT CHANGE UP (ref 8.5–10.1)
CHLORIDE SERPL-SCNC: 109 MMOL/L — HIGH (ref 96–108)
CO2 SERPL-SCNC: 27 MMOL/L — SIGNIFICANT CHANGE UP (ref 22–31)
CREAT SERPL-MCNC: 0.8 MG/DL — SIGNIFICANT CHANGE UP (ref 0.5–1.3)
CULTURE RESULTS: SIGNIFICANT CHANGE UP
EGFR: 102 ML/MIN/1.73M2 — SIGNIFICANT CHANGE UP
GLUCOSE BLDC GLUCOMTR-MCNC: 103 MG/DL — HIGH (ref 70–99)
GLUCOSE BLDC GLUCOMTR-MCNC: 82 MG/DL — SIGNIFICANT CHANGE UP (ref 70–99)
GLUCOSE BLDC GLUCOMTR-MCNC: 83 MG/DL — SIGNIFICANT CHANGE UP (ref 70–99)
GLUCOSE SERPL-MCNC: 96 MG/DL — SIGNIFICANT CHANGE UP (ref 70–99)
HCT VFR BLD CALC: 36.3 % — SIGNIFICANT CHANGE UP (ref 34.5–45)
HGB BLD-MCNC: 11.4 G/DL — LOW (ref 11.5–15.5)
MAGNESIUM SERPL-MCNC: 2 MG/DL — SIGNIFICANT CHANGE UP (ref 1.6–2.6)
MCHC RBC-ENTMCNC: 27 PG — SIGNIFICANT CHANGE UP (ref 27–34)
MCHC RBC-ENTMCNC: 31.4 G/DL — LOW (ref 32–36)
MCV RBC AUTO: 85.8 FL — SIGNIFICANT CHANGE UP (ref 80–100)
NRBC # BLD: 0 /100 WBCS — SIGNIFICANT CHANGE UP (ref 0–0)
PHOSPHATE SERPL-MCNC: 4.3 MG/DL — SIGNIFICANT CHANGE UP (ref 2.5–4.5)
PLATELET # BLD AUTO: 233 K/UL — SIGNIFICANT CHANGE UP (ref 150–400)
POTASSIUM SERPL-MCNC: 4.1 MMOL/L — SIGNIFICANT CHANGE UP (ref 3.5–5.3)
POTASSIUM SERPL-SCNC: 4.1 MMOL/L — SIGNIFICANT CHANGE UP (ref 3.5–5.3)
RBC # BLD: 4.23 M/UL — SIGNIFICANT CHANGE UP (ref 3.8–5.2)
RBC # FLD: 18.3 % — HIGH (ref 10.3–14.5)
SODIUM SERPL-SCNC: 138 MMOL/L — SIGNIFICANT CHANGE UP (ref 135–145)
SPECIMEN SOURCE: SIGNIFICANT CHANGE UP
WBC # BLD: 4.27 K/UL — SIGNIFICANT CHANGE UP (ref 3.8–10.5)
WBC # FLD AUTO: 4.27 K/UL — SIGNIFICANT CHANGE UP (ref 3.8–10.5)

## 2023-04-27 PROCEDURE — 99232 SBSQ HOSP IP/OBS MODERATE 35: CPT

## 2023-04-27 RX ADMIN — QUETIAPINE FUMARATE 200 MILLIGRAM(S): 200 TABLET, FILM COATED ORAL at 23:42

## 2023-04-27 RX ADMIN — GABAPENTIN 300 MILLIGRAM(S): 400 CAPSULE ORAL at 05:44

## 2023-04-27 RX ADMIN — ENOXAPARIN SODIUM 40 MILLIGRAM(S): 100 INJECTION SUBCUTANEOUS at 05:51

## 2023-04-27 RX ADMIN — QUETIAPINE FUMARATE 200 MILLIGRAM(S): 200 TABLET, FILM COATED ORAL at 00:21

## 2023-04-27 RX ADMIN — GABAPENTIN 300 MILLIGRAM(S): 400 CAPSULE ORAL at 23:42

## 2023-04-27 RX ADMIN — GABAPENTIN 300 MILLIGRAM(S): 400 CAPSULE ORAL at 17:10

## 2023-04-27 RX ADMIN — Medication 10 MILLIGRAM(S): at 11:57

## 2023-04-27 RX ADMIN — GABAPENTIN 300 MILLIGRAM(S): 400 CAPSULE ORAL at 00:20

## 2023-04-27 RX ADMIN — QUETIAPINE FUMARATE 200 MILLIGRAM(S): 200 TABLET, FILM COATED ORAL at 05:44

## 2023-04-27 RX ADMIN — GABAPENTIN 300 MILLIGRAM(S): 400 CAPSULE ORAL at 11:56

## 2023-04-27 RX ADMIN — SENNA PLUS 2 TABLET(S): 8.6 TABLET ORAL at 22:19

## 2023-04-27 RX ADMIN — QUETIAPINE FUMARATE 200 MILLIGRAM(S): 200 TABLET, FILM COATED ORAL at 11:57

## 2023-04-27 RX ADMIN — Medication 1 MILLIGRAM(S): at 05:45

## 2023-04-27 RX ADMIN — LURASIDONE HYDROCHLORIDE 120 MILLIGRAM(S): 40 TABLET ORAL at 11:58

## 2023-04-27 RX ADMIN — QUETIAPINE FUMARATE 200 MILLIGRAM(S): 200 TABLET, FILM COATED ORAL at 17:10

## 2023-04-27 RX ADMIN — Medication 1 MILLIGRAM(S): at 17:09

## 2023-04-27 RX ADMIN — DIVALPROEX SODIUM 1000 MILLIGRAM(S): 500 TABLET, DELAYED RELEASE ORAL at 22:19

## 2023-04-27 NOTE — PROGRESS NOTE ADULT - ASSESSMENT
26 y/o F w/ PMHx of Cerebral Palsy (per caretaker, pt signs and does say a few words at baseline), Autism, DM type 2, Pseudoseizures presents to the ED s/p seizures, pt being admitted for further evaluation    # Seizures  - Valproic acid level wnl therapeutic range  - seizure precautions  - c/w Depakote  - CT head neg  - Neuro consult appreciated   continuous EEG in progress     # Abdominal distension, resolved   - KUB--neg     # Autism  # Depression  # CP  - c/w Klonopin, fluoxetine, lurasidone    # DM   A1c 5.9%  avoid hypoglycemia   stop ISS       # DVT ppx - lovenox SQ  aspiration, seizure, fall precautions   HOBE

## 2023-04-27 NOTE — EEG REPORT - NS EEG TEXT BOX
TIERNEY VALENCIA [id number] 27y (1995)F  Admitting MD: Dr. Rachelle Adam    Study Date: 04-27-23    --------------------------------------------------------------------------------------------------  History:  CC/ HPI Patient is a 27y old  Female who presents with a chief complaint of Seizure (27 Apr 2023 09:11)    clonazePAM  Tablet 1 milliGRAM(s) Oral two times a day  dextrose 5%. 1000 milliLiter(s) IV Continuous <Continuous>  dextrose 5%. 1000 milliLiter(s) IV Continuous <Continuous>  dextrose 50% Injectable 25 Gram(s) IV Push once  dextrose 50% Injectable 25 Gram(s) IV Push once  dextrose 50% Injectable 12.5 Gram(s) IV Push once  divalproex ER 1000 milliGRAM(s) Oral at bedtime  enoxaparin Injectable 40 milliGRAM(s) SubCutaneous every 24 hours  FLUoxetine Solution 10 milliGRAM(s) Oral daily  gabapentin 300 milliGRAM(s) Oral four times a day  glucagon  Injectable 1 milliGRAM(s) IntraMuscular once  lurasidone 120 milliGRAM(s) Oral daily  QUEtiapine 200 milliGRAM(s) Oral four times a day  senna 2 Tablet(s) Oral at bedtime    --------------------------------------------------------------------------------------------------  Study Interpretation:    [[[Abbreviation Key:  PDR=alpha rhythm/posterior dominant rhythm. A-P=anterior posterior gradient.  Amplitude: ‘very low’:<20; ‘low’:20-50; ‘medium’:; ‘high’:>200uV.  Persistence for periodic/rhythmic patterns (% of epoch) ‘rare’:<1%; ‘occasional’:1-10%; ‘frequent’:10-50%; ‘abundant’:50-90%; ‘continuous’:>90%.  Persistence for sporadic discharges: ‘rare’:<1/hr; ‘occasional’:1/min-1/hr; ‘frequent’:>1/min; ‘abundant’:>1/10 sec.  GRDA=generalized rhythmic delta activity; FIRDA=frontal intermittent GRDA; LRDA=lateralized rhythmic delta activity; TIRDA=temporal intermittent rhythmic delta activity;  LPD=PLED=lateralized periodic discharges; GPD=generalized periodic discharges; BiPDs=BiPLEDs=bilateral independent periodic epileptiform discharges; SIRPID=stimulus induced rhythmic, periodic, or ictal appearing discharges; BIRDs=brief potentially ictal rhythmic discharges >4 Hz, lasting .5-10s; PFA (paroxysmal bursts >13 Hz or =8 Hz).  Modifiers: +F=with fast component; +S=with spike component; +R=with rhythmic component.  S-B=burst suppression pattern.  Max=maximal. N1-drowsy; N2-stage II sleep; N3-slow wave sleep. SSS/BETS=small sharp spikes/benign epileptiform transients of sleep. HV=hyperventilation; PS=photic stimulation]]]    FINDINGS:  The background was continuous, spontaneously variable and reactive.  During wakefulness, the posteriorly dominant rhythm consisted of symmetric, well modulated 8.5 Hz activity, with an amplitude to 40 uV, that attenuated to eye opening.  Low amplitude central beta was noted in wakefulness.    Background Slowing:  Generalized slowing: none was present.  Focal slowing: none was present.    Sleep Background:  -N2 sleep transients were not recorded.    Epileptiform Activity:   No interictal epileptiform discharges were present.    Events:  No clinical events were recorded.  No seizures were recorded.    Activation Procedures:   -Hyperventilation was not performed.    -Photic stimulation was not performed.    Artifacts:  Intermittent myogenic and external motion artifacts were noted.    ECG:  The heart rate on single channel ECG at baseline was predominantly near BPM =   -----------------------------------------------------------------------------------------------------    EEG Classification / Summary:  Normal EEG study, awake      -----------------------------------------------------------------------------------------------------    Clinical Impression:  There were no epileptiform abnormalities recorded.      Lacy Syed MD  PGY-6 Pediatric Epilepsy    ***THIS IS A PRELIMINARY FELLOW REPORT PENDING REVIEW WITH ATTENDING EPILEPTOLOGIST***    -------------------------------------------------------------------------------------------------------  [facility] EEG Reading Room Ph#: [phone]  Epilepsy Answering Service after 5PM and before 8:30AM: Ph#: [phone]    	   TIERNEY VALENCIA [id number] 27y (1995)F  Admitting MD: Dr. Rachelle Adam    Study Date: 04-27-23    --------------------------------------------------------------------------------------------------  History:  CC/ HPI Patient is a 27y old  Female who presents with a chief complaint of Seizure (27 Apr 2023 09:11)    clonazePAM  Tablet 1 milliGRAM(s) Oral two times a day  dextrose 5%. 1000 milliLiter(s) IV Continuous <Continuous>  dextrose 5%. 1000 milliLiter(s) IV Continuous <Continuous>  dextrose 50% Injectable 25 Gram(s) IV Push once  dextrose 50% Injectable 25 Gram(s) IV Push once  dextrose 50% Injectable 12.5 Gram(s) IV Push once  divalproex ER 1000 milliGRAM(s) Oral at bedtime  enoxaparin Injectable 40 milliGRAM(s) SubCutaneous every 24 hours  FLUoxetine Solution 10 milliGRAM(s) Oral daily  gabapentin 300 milliGRAM(s) Oral four times a day  glucagon  Injectable 1 milliGRAM(s) IntraMuscular once  lurasidone 120 milliGRAM(s) Oral daily  QUEtiapine 200 milliGRAM(s) Oral four times a day  senna 2 Tablet(s) Oral at bedtime    --------------------------------------------------------------------------------------------------  Study Interpretation:    [[[Abbreviation Key:  PDR=alpha rhythm/posterior dominant rhythm. A-P=anterior posterior gradient.  Amplitude: ‘very low’:<20; ‘low’:20-50; ‘medium’:; ‘high’:>200uV.  Persistence for periodic/rhythmic patterns (% of epoch) ‘rare’:<1%; ‘occasional’:1-10%; ‘frequent’:10-50%; ‘abundant’:50-90%; ‘continuous’:>90%.  Persistence for sporadic discharges: ‘rare’:<1/hr; ‘occasional’:1/min-1/hr; ‘frequent’:>1/min; ‘abundant’:>1/10 sec.  GRDA=generalized rhythmic delta activity; FIRDA=frontal intermittent GRDA; LRDA=lateralized rhythmic delta activity; TIRDA=temporal intermittent rhythmic delta activity;  LPD=PLED=lateralized periodic discharges; GPD=generalized periodic discharges; BiPDs=BiPLEDs=bilateral independent periodic epileptiform discharges; SIRPID=stimulus induced rhythmic, periodic, or ictal appearing discharges; BIRDs=brief potentially ictal rhythmic discharges >4 Hz, lasting .5-10s; PFA (paroxysmal bursts >13 Hz or =8 Hz).  Modifiers: +F=with fast component; +S=with spike component; +R=with rhythmic component.  S-B=burst suppression pattern.  Max=maximal. N1-drowsy; N2-stage II sleep; N3-slow wave sleep. SSS/BETS=small sharp spikes/benign epileptiform transients of sleep. HV=hyperventilation; PS=photic stimulation]]]    FINDINGS:  The background was continuous, spontaneously variable and reactive.  During wakefulness, the posteriorly dominant rhythm consisted of symmetric, well modulated 8.5 Hz activity, with an amplitude to 40 uV, that attenuated to eye opening.  Low amplitude central beta was noted in wakefulness.    Background Slowing:  Generalized slowing: none was present.  Focal slowing: none was present.    Sleep Background:  -N2 sleep transients were not recorded.    Epileptiform Activity:   No interictal epileptiform discharges were present.    Events:  No clinical events were recorded.  No seizures were recorded.    Activation Procedures:   -Hyperventilation was not performed.    -Photic stimulation was not performed.    Artifacts:  Intermittent myogenic and external motion artifacts were noted.    ECG:  The heart rate on single channel ECG at baseline was predominantly near BPM =   -----------------------------------------------------------------------------------------------------    EEG Classification / Summary:  Normal EEG study, awake      -----------------------------------------------------------------------------------------------------    Clinical Impression:  There were no epileptiform abnormalities recorded.      Lacy Syed MD  PGY-6 Pediatric Epilepsy    Ezra Gray MD PhD  Director, Epilepsy Division, [facility]     -------------------------------------------------------------------------------------------------------  [facility] EEG Reading Room Ph#: [phone]  Epilepsy Answering Service after 5PM and before 8:30AM: Ph#: [phone]

## 2023-04-27 NOTE — PROGRESS NOTE ADULT - ASSESSMENT
26 y/o F w/ PMHx of Cerebral Palsy (per caretaker, pt signs and does say a few words at baseline), Autism, DM type 2, Pseudoseizures presents to the ED s/p seizures. Caretaker reports pt had 3 seizures (similar to her pseudoseizures); diffuse shaking lasting 10 - 15 secs in a 30 mins space PE as above no verbal outptu increased tone moves right more than left  CTH stable chronic left cerebelar atrophy  lactate upon presentation was 1.4    Impression: pseudoseizure vs seizure     extended EEG if possible  VPA level 79  On Depakote ER 1000 mg qhs    28 y/o F w/ PMHx of Cerebral Palsy (per caretaker, pt signs and does say a few words at baseline), Autism, DM type 2, Pseudoseizures presents to the ED s/p seizures. Caretaker reports pt had 3 seizures (similar to her pseudoseizures); diffuse shaking lasting 10 - 15 secs in a 30 mins space PE as above no verbal outptut increased tone moves right more than left  CTH stable chronic left cerebellar atrophy  lactate upon presentation was 1.4    Impression: pseudoseizure vs seizure     extended EEG if possible  VPA level 79  On Depakote ER 1000 mg qhs

## 2023-04-28 ENCOUNTER — TRANSCRIPTION ENCOUNTER (OUTPATIENT)
Age: 28
End: 2023-04-28

## 2023-04-28 VITALS — TEMPERATURE: 98 F | HEART RATE: 78 BPM | OXYGEN SATURATION: 98 % | RESPIRATION RATE: 18 BRPM

## 2023-04-28 PROCEDURE — 99239 HOSP IP/OBS DSCHRG MGMT >30: CPT

## 2023-04-28 RX ORDER — DIVALPROEX SODIUM 500 MG/1
2 TABLET, DELAYED RELEASE ORAL
Qty: 0 | Refills: 0 | DISCHARGE

## 2023-04-28 RX ORDER — GABAPENTIN 400 MG/1
0 CAPSULE ORAL
Qty: 0 | Refills: 0 | DISCHARGE

## 2023-04-28 RX ORDER — FLUOXETINE HCL 10 MG
2.5 CAPSULE ORAL
Qty: 0 | Refills: 0 | DISCHARGE
Start: 2023-04-28

## 2023-04-28 RX ORDER — SENNA PLUS 8.6 MG/1
2 TABLET ORAL
Qty: 0 | Refills: 0 | DISCHARGE
Start: 2023-04-28

## 2023-04-28 RX ORDER — GABAPENTIN 400 MG/1
1 CAPSULE ORAL
Qty: 0 | Refills: 0 | DISCHARGE
Start: 2023-04-28

## 2023-04-28 RX ORDER — QUETIAPINE FUMARATE 200 MG/1
1 TABLET, FILM COATED ORAL
Qty: 0 | Refills: 0 | DISCHARGE
Start: 2023-04-28

## 2023-04-28 RX ORDER — DIVALPROEX SODIUM 500 MG/1
2 TABLET, DELAYED RELEASE ORAL
Qty: 0 | Refills: 0 | DISCHARGE
Start: 2023-04-28

## 2023-04-28 RX ORDER — QUETIAPINE FUMARATE 200 MG/1
1 TABLET, FILM COATED ORAL
Qty: 0 | Refills: 0 | DISCHARGE

## 2023-04-28 RX ADMIN — Medication 1 MILLIGRAM(S): at 20:02

## 2023-04-28 RX ADMIN — QUETIAPINE FUMARATE 200 MILLIGRAM(S): 200 TABLET, FILM COATED ORAL at 20:03

## 2023-04-28 RX ADMIN — GABAPENTIN 300 MILLIGRAM(S): 400 CAPSULE ORAL at 16:48

## 2023-04-28 RX ADMIN — Medication 10 MILLIGRAM(S): at 16:47

## 2023-04-28 RX ADMIN — LURASIDONE HYDROCHLORIDE 40 MILLIGRAM(S): 40 TABLET ORAL at 16:48

## 2023-04-28 RX ADMIN — GABAPENTIN 300 MILLIGRAM(S): 400 CAPSULE ORAL at 20:03

## 2023-04-28 RX ADMIN — GABAPENTIN 300 MILLIGRAM(S): 400 CAPSULE ORAL at 06:09

## 2023-04-28 RX ADMIN — QUETIAPINE FUMARATE 200 MILLIGRAM(S): 200 TABLET, FILM COATED ORAL at 06:09

## 2023-04-28 RX ADMIN — ENOXAPARIN SODIUM 40 MILLIGRAM(S): 100 INJECTION SUBCUTANEOUS at 06:10

## 2023-04-28 RX ADMIN — QUETIAPINE FUMARATE 200 MILLIGRAM(S): 200 TABLET, FILM COATED ORAL at 16:49

## 2023-04-28 RX ADMIN — Medication 1 MILLIGRAM(S): at 06:09

## 2023-04-28 NOTE — DISCHARGE NOTE PROVIDER - CARE PROVIDER_API CALL
your primary care doctor and your neurologist,   Phone: (   )    -  Fax: (   )    -  Follow Up Time: 1 week    Tom Gaona)  Neurology; Neurophysiology  [address] [facility] [address] [place]  [place] [address]  Phone: [phone]  Fax: [phone]  Follow Up Time: 2 weeks

## 2023-04-28 NOTE — DISCHARGE NOTE NURSING/CASE MANAGEMENT/SOCIAL WORK - PATIENT PORTAL LINK FT
You can access the FollowMyHealth Patient Portal offered by [facility] by registering at the following website: [email or web address]. By joining [facility]’s FollowMyHealth portal, you will also be able to view your health information using other applications (apps) compatible with our system.

## 2023-04-28 NOTE — DISCHARGE NOTE PROVIDER - HOSPITAL COURSE
28 y/o F w/ PMHx of Cerebral Palsy (per caretaker, pt signs and does say a few words at baseline), Autism,  seizure d/o, history of Pseudoseizures presents to the ED s/p seizures-like activity in her [facility]. In EMS , she had repeat seizure-like activity and was given versed 5mg IM x 2.   Patient afebrile, WBC wnl. Labs otherwise not very remarkable. No e/o infection. Valproic acid level OK. CTH Stable encephalomalacia in the medial aspect of the   left cerebellum without acute findings. KUB unremarkable. EKG nsr.   Patient was seen by Neurology who recommended continuous EEG monitoring, which did not show any seizure activity.   Patient is at her baseline MS and no further events during this hospitalization.   Per Neurology, no change in her medication regimen.   Care plan and all findings were discussed in detail with patient and  KIM Fang [phone].  All questions and concerns addressed     Vital Signs Last 24 Hrs  T(C): 36.8 (28 Apr 2023 05:25), Max: 36.9 (27 Apr 2023 17:00)  T(F): 98.2 (28 Apr 2023 05:25), Max: 98.5 (27 Apr 2023 17:00)  HR: 70 (28 Apr 2023 05:25) (70 - 76)  BP: 125/82 (28 Apr 2023 05:25) (105/75 - 125/82)  BP(mean): --  RR: 18 (28 Apr 2023 05:25) (18 - 18)  SpO2: 98% (28 Apr 2023 05:25) (96% - 99%)    Parameters below as of 28 Apr 2023 05:25  Patient On (Oxygen Delivery Method): room air  Gen: NAD, no increased WOB   NERVOUS SYSTEM:  awake, nonverbal, protecting airway, moving extremities ,  , eating without issues , no facial droop   CHEST/LUNG: Clear to auscultation bilaterally; No rales, rhonchi, wheezing, or rubs  HEART: Regular rate and rhythm; No murmurs, rubs, or gallops  ABDOMEN: Soft, Nontender, Nondistended; Bowel sounds present  EXTREMITIES:  2+ Peripheral Pulses b/l, No clubbing, cyanosis, calf tenderness or edema b/l    Discharged diagnoses:  Seizure vs Pseudoseizure   Cerebral Palsy  Autism  Nonverbal/minimally verbal   patient able to ambulate with assistance which is her baseline         RETURN PARAMETERS DISCUSSED .   Discharge time : 40 min    28 y/o F w/ PMHx of Cerebral Palsy (per caretaker, pt signs and does say a few words at baseline), Autism,  DM2, seizure d/o, history of Pseudoseizures presents to the ED s/p seizures-like activity in her Group [facility]. In EMS , she had repeat seizure-like activity and was given versed 5mg IM x 2.   Patient afebrile, WBC wnl. Labs otherwise not very remarkable. No e/o infection. Valproic acid level OK. CTH Stable encephalomalacia in the medial aspect of the   left cerebellum without acute findings. KUB unremarkable. EKG nsr.   Patient was seen by Neurology who recommended continuous EEG monitoring, which did not show any seizure activity.   Patient is at her baseline MS and no further events during this hospitalization.   Per Neurology, no change in her medication regimen.   Care plan and all findings were discussed in detail with patient and  KIM Fang [phone].  All questions and concerns addressed     Vital Signs Last 24 Hrs  T(C): 36.8 (28 Apr 2023 05:25), Max: 36.9 (27 Apr 2023 17:00)  T(F): 98.2 (28 Apr 2023 05:25), Max: 98.5 (27 Apr 2023 17:00)  HR: 70 (28 Apr 2023 05:25) (70 - 76)  BP: 125/82 (28 Apr 2023 05:25) (105/75 - 125/82)  BP(mean): --  RR: 18 (28 Apr 2023 05:25) (18 - 18)  SpO2: 98% (28 Apr 2023 05:25) (96% - 99%)    Parameters below as of 28 Apr 2023 05:25  Patient On (Oxygen Delivery Method): room air  Gen: NAD, no increased WOB   NERVOUS SYSTEM:  awake, nonverbal, protecting airway, moving extremities ,  , eating without issues , no facial droop   CHEST/LUNG: Clear to auscultation bilaterally; No rales, rhonchi, wheezing, or rubs  HEART: Regular rate and rhythm; No murmurs, rubs, or gallops  ABDOMEN: Soft, Nontender, Nondistended; Bowel sounds present  EXTREMITIES:  2+ Peripheral Pulses b/l, No clubbing, cyanosis, calf tenderness or edema b/l    Discharged diagnoses:  Seizure vs Pseudoseizure , continue same medication regimen   DM2 , A1c 5.9%, noted to be on metformin 1g bid at home, I discussed with  RN Briana who stated she will reach out to the  PMD to lower the dose of metformin . no e/o hypoglycemia   Acne (face)   Cerebral Palsy  Autism  Nonverbal/minimally verbal   patient able to ambulate with assistance which is her baseline         RETURN PARAMETERS DISCUSSED .   Discharge time : 40 min

## 2023-04-28 NOTE — DISCHARGE NOTE PROVIDER - NSDCCPCAREPLAN_GEN_ALL_CORE_FT
PRINCIPAL DISCHARGE DIAGNOSIS  Diagnosis: Seizures  Assessment and Plan of Treatment:       SECONDARY DISCHARGE DIAGNOSES  Diagnosis: Cerebral palsy  Assessment and Plan of Treatment:     Diagnosis: Autism  Assessment and Plan of Treatment:      PRINCIPAL DISCHARGE DIAGNOSIS  Diagnosis: Seizures  Assessment and Plan of Treatment: continue same medication regimen      SECONDARY DISCHARGE DIAGNOSES  Diagnosis: Cerebral palsy  Assessment and Plan of Treatment:     Diagnosis: Autism  Assessment and Plan of Treatment:     Diagnosis: Diabetes mellitus  Assessment and Plan of Treatment: A1c 5.9%, please discuss with patient's PMD re lowering the dose

## 2023-04-28 NOTE — DISCHARGE NOTE PROVIDER - NSDCMRMEDTOKEN_GEN_ALL_CORE_FT
clindamycin 1% topical gel: Apply topically to affected area 2 times a day  clonazePAM 1 mg oral tablet:  orally 2 times a day  Depakote  mg oral tablet, extended release: 2 tab(s) orally once a day (at bedtime)  docusil  with senna plus 8.6 mg-50 mg 2 tabs bed time orally:   Epi EZ Pen:   , As Needed  gabapentin 300 mg oral tablet: orally 4 times a day  lactulose 10 g/15 mL oral solution: 30 milliliter(s) orally once a day (at bedtime)  Latuda 120 mg oral tablet: 1 tab(s) orally once a day  metFORMIN 1000 mg oral tablet: 1 tab(s) orally 2 times a day  QUEtiapine 200 mg oral tablet:  four times a day  Vitamin D3 1000 intl units oral capsule: 1 cap(s) orally once a day   clindamycin 1% topical gel: Apply topically to affected area 2 times a day  clonazePAM 1 mg oral tablet:  orally 2 times a day  divalproex sodium 500 mg oral tablet, extended release: 2 tab(s) orally once a day (at bedtime)  Epi EZ Pen:   , As Needed  FLUoxetine 20 mg/5 mL oral solution: 2.5 milliliter(s) orally once a day  gabapentin 300 mg oral capsule: 1 cap(s) orally 4 times a day  lactulose 10 g/15 mL oral solution: 30 milliliter(s) orally once a day (at bedtime)  Latuda 120 mg oral tablet: 1 tab(s) orally once a day  metFORMIN 1000 mg oral tablet: 1 tab(s) orally 2 times a day  QUEtiapine 200 mg oral tablet: 1 tab(s) orally 4 times a day  senna leaf extract oral tablet: 2 tab(s) orally once a day (at bedtime)  Vitamin D3 1000 intl units oral capsule: 1 cap(s) orally once a day

## 2023-04-28 NOTE — DISCHARGE NOTE PROVIDER - PROVIDER TOKENS
FREE:[LAST:[your primary care doctor and your neurologist],PHONE:[(   )    -],FAX:[(   )    -],FOLLOWUP:[1 week]],PROVIDER:[TOKEN:[[id number]],FOLLOWUP:[2 weeks]]

## 2023-04-28 NOTE — PROGRESS NOTE ADULT - SUBJECTIVE AND OBJECTIVE BOX
PROGRESS NOTE:     Patient is a 27y old  Female who presents with a chief complaint of Seizure (2023 15:03)        SUBJECTIVE & OBJECTIVE:   Pt seen and examined at bedside in AM    no overnight events.     ROS: unable to examine due to [ ] Encephalopathy  [ ] Advanced Dementia  [ ] Expressive Aphasia  [x ] Non-verbal patient     PHYSICAL EXAM:  Vital Signs Last 24 Hrs  T(C): 37.1 (2023 05:17), Max: 37.1 (2023 05:17)  T(F): 98.8 (2023 05:17), Max: 98.8 (2023 05:17)  HR: 76 (2023 05:17) (72 - 81)  BP: 103/67 (2023 05:17) (103/67 - 123/85)  BP(mean): --  RR: 18 (2023 05:17) (17 - 18)  SpO2: 100% (2023 05:17) (98% - 100%)    Parameters below as of 2023 05:17  Patient On (Oxygen Delivery Method): room air        GENERAL: NAD,  no increased WOB  HEAD:  Atraumatic, Normocephalic  EYES: EOMI, PERRLA, conjunctiva and sclera clear  ENMT: Moist mucous membranes  NECK: Supple, No JVD  NERVOUS SYSTEM:  awake, nonverbal, protecting airway, moving extremities   CHEST/LUNG: Clear to auscultation bilaterally; No rales, rhonchi, wheezing, or rubs  HEART: Regular rate and rhythm; No murmurs, rubs, or gallops  ABDOMEN: Soft, Nontender, Nondistended; Bowel sounds present  EXTREMITIES:  2+ Peripheral Pulses b/l, No clubbing, cyanosis, calf tenderness or edema b/l    MEDICATIONS  (STANDING):  clonazePAM  Tablet 1 milliGRAM(s) Oral two times a day  dextrose 5%. 1000 milliLiter(s) (50 mL/Hr) IV Continuous <Continuous>  dextrose 5%. 1000 milliLiter(s) (100 mL/Hr) IV Continuous <Continuous>  dextrose 50% Injectable 25 Gram(s) IV Push once  dextrose 50% Injectable 25 Gram(s) IV Push once  dextrose 50% Injectable 12.5 Gram(s) IV Push once  divalproex ER 1000 milliGRAM(s) Oral at bedtime  enoxaparin Injectable 40 milliGRAM(s) SubCutaneous every 24 hours  FLUoxetine Solution 10 milliGRAM(s) Oral daily  gabapentin 300 milliGRAM(s) Oral four times a day  glucagon  Injectable 1 milliGRAM(s) IntraMuscular once  lurasidone 120 milliGRAM(s) Oral daily  QUEtiapine 200 milliGRAM(s) Oral four times a day  senna 2 Tablet(s) Oral at bedtime    MEDICATIONS  (PRN):  acetaminophen     Tablet .. 650 milliGRAM(s) Oral every 6 hours PRN Temp greater or equal to 38C (100.4F), Mild Pain (1 - 3)  aluminum hydroxide/magnesium hydroxide/simethicone Suspension 30 milliLiter(s) Oral every 4 hours PRN Dyspepsia  dextrose Oral Gel 15 Gram(s) Oral once PRN Blood Glucose LESS THAN 70 milliGRAM(s)/deciliter  melatonin 3 milliGRAM(s) Oral at bedtime PRN Insomnia  ondansetron Injectable 4 milliGRAM(s) IV Push every 8 hours PRN Nausea and/or Vomiting      LABS:                        11.0   4.69  )-----------( 196      ( 2023 07:35 )             35.2     04-26    136  |  107  |  8   ----------------------------<  85  3.9   |  25  |  0.60    Ca    8.6      2023 07:35    TPro  8.3  /  Alb  3.3  /  TBili  0.2  /  DBili  x   /  AST  45<H>  /  ALT  23  /  AlkPhos  76  04-25      Urinalysis Basic - ( 2023 00:09 )    Color: Yellow / Appearance: Clear / S.010 / pH: x  Gluc: x / Ketone: Trace  / Bili: Negative / Urobili: 1 mg/dL   Blood: x / Protein: 15 mg/dL / Nitrite: Negative   Leuk Esterase: Negative / RBC: 0-2 /HPF / WBC 0-2   Sq Epi: x / Non Sq Epi: x / Bacteria: Few        RECENT CULTURES:          RADIOLOGY & ADDITIONAL TESTS:          Radiology reports read and imaging reviewed  :  [ x] YES  [ ] NO  (I am not a radiologist and therefore rely on Radiologist reports to facilitate with diagnosis and treatment plans)    Consultant(s) Notes Reviewed:  [x ] YES  [ ] NO    Care Discussed with Consultants/Other Providers [x ] YES  [ ] NO  Care plan and all findings were discussed in detail with patient.  All questions and concerns addressed  
{\rtf1\wsolxj75613\ansi\gcugpcz8210\ftnbj\uc1\deff0  {\fonttbl{\f0 \fnil Segoe UI;}{\f1 \fnil \fcharset0 Segoe UI;}{\f2 \fnil Times New [place];}}  {\colortbl ;\ldm994\obvgg056\eyzl420 ;\red0\green0\blue0 ;\red0\green0\uyqc728 ;\red0\green0\blue0 ;}  {\stylesheet{\f0\fs20 Normal;}{\cs1 Default Paragraph Font;}{\cs2\f0\fs16 Line Number;}{\cs3\f2\fs24\ul\cf3 Hyperlink;}}  {\*\revtbl{Unknown;}}  \gexvyc30593\qmvveo76682\btglg2416\spmzi9013\rcvvk9479\wxvck1136\ppuzoax451\righopd282\nogrowautofit\gmkrts390\formshade\nofeaturethrottle1\dntblnsbdb\fet4\aendnotes\aftnnrlc\pgbrdrhead\pgbrdrfoot  \sectd\jymqtl34081\eyydhk48573\guttersxn0\kimdvjfi8184\gxedkdvw1369\ticmbpka7578\kvubflqi9745\cusjwwj784\ajflqib172\sbkpage\pgncont\pgndec  \plain\plain\f0\fs24\pard\plain\f0\fs24\plain\f0\fs20\xyfd1149\hich\f0\dbch\f0\loch\f0\fs20 Patient seen and examined this am. No new events\par  \par  MEDICATIONS:\par  \par  acetaminophen     Tablet .. 650 milliGRAM(s) Oral every 6 hours PRN\par  aluminum hydroxide/magnesium hydroxide/simethicone Suspension 30 milliLiter(s) Oral every 4 hours PRN\par  clonazePAM  Tablet 1 milliGRAM(s) Oral two times a day\par  dextrose 5%. 1000 milliLiter(s) IV Continuous <Continuous>\par  dextrose 5%. 1000 milliLiter(s) IV Continuous <Continuous>\par  dextrose 50% Injectable 25 Gram(s) IV Push once\par  dextrose 50% Injectable 12.5 Gram(s) IV Push once\par  dextrose 50% Injectable 25 Gram(s) IV Push once\par  dextrose Oral Gel 15 Gram(s) Oral once PRN\par  divalproex ER 1000 milliGRAM(s) Oral at bedtime\par  enoxaparin Injectable 40 milliGRAM(s) SubCutaneous every 24 hours\par  FLUoxetine Solution 10 milliGRAM(s) Oral daily\par  gabapentin 300 milliGRAM(s) Oral four times a day\par  glucagon  Injectable 1 milliGRAM(s) IntraMuscular once\par  lurasidone 120 milliGRAM(s) Oral daily\par  melatonin 3 milliGRAM(s) Oral at bedtime PRN\par  ondansetron Injectable 4 milliGRAM(s) IV Push every 8 hours PRN\par  QUEtiapine 200 milliGRAM(s) Oral four times a day\par  senna 2 Tablet(s) Oral at bedtime\par  \par  \par  LABS:\par  \par           \par             11.4 \par  4.27  )-----------( 233      ( 27 Apr 2023 10:40 )\par             36.3 \par  \par  04-27\par  \par  138  |  109<H>  |  8 \par  ----------------------------<  96\par  4.1   |  27  |  0.80\par  \par  Ca    9.5      27 Apr 2023 10:40\par  Phos  4.3     04-27\par  Mg     2.0     04-27\par  \par  \par  CAPILLARY BLOOD GLUCOSE\par  \par  \par  POCT Blood Glucose.: 103 mg/dL (27 Apr 2023 16:05)\par  POCT Blood Glucose.: 82 mg/dL (27 Apr 2023 10:56)\par  \par  \par  \par  I&O's Summary\par  \par  Vital Signs Last 24 Hrs\par  T(C): 36.8 (28 Apr 2023 05:25), Max: 36.9 (27 Apr 2023 17:00)\par  T(F): 98.2 (28 Apr 2023 05:25), Max: 98.5 (27 Apr 2023 17:00)\par  HR: 70 (28 Apr 2023 05:25) (70 - 78)\par  BP: 125/82 (28 Apr 2023 05:25) (103/68 - 125/82)\par  BP(mean): --\par  RR: 18 (28 Apr 2023 05:25) (18 - 18)\par  SpO2: 98% (28 Apr 2023 05:25) (96% - 99%)\par  \par  Parameters below as of 28 Apr 2023 05:25\par  Patient On (Oxygen Delivery Method): room air\par  \par  \par  \par  On Neurological Examination:\par  \par  Mental Status - Patient is alert, awake, follows simple commands no verbal oupt\par  \par  Cranial Nerves - PERRL, extraocular movements intact with exotropia, VFF, V1-V3 intact, no gross facial asymmetry,\par  \par  Motor Exam - \par  increased tone moves all ext appears to move right side more readily \par  Sensory    Intact to light touch and pinprick bilaterally\par  \par  Coord: FTN intact bilaterally \par  \par  Gait -deferred        \par                                \par  \par   \par  \par  \par  RADIOLOGY\par  < from: CT Head No Cont (04.25.23 @ 21:48) >\par  \ql\plain\f0\fs24\plain\f1\fs16\sqcf5703\hich\f1\dbch\f1\loch\f1\cf2\fs16 IMPRESSION:\par  No large territory acute infarct, intracranial hemorrhage, or mass \par  effect. Stable exam compared to November 2009.\par  \par  MRI is more sensitive for evaluation of postictal change and/or \par  epileptogenic foci.\par  \par  --- End of Report ---\par  \pard\plain\f0\fs24\plain\f0\fs20\uyrq6721\hich\f0\dbch\f0\loch\f0\fs20\par  \par  < end of copied text >\par  \par  \par  \par  \ql\plain\f0\fs24\plain\f1\fs16\rjzd6040\hich\f1\dbch\f1\loch\f1\cf2\fs16\par  Clinical Impression:\par  There were no epileptiform abnormalities recorded.  \par  \par  Lacy Syed MD\par  PGY-6 Pediatric Epilepsy\par  \par  Ezra Gray MD PhD\par  Director, Epilepsy Division, [facility] \par  \par  -------------------------------------------------------------------------------------------------------\par  [facility] EEG Reading Room Ph#: [phone]\par  Epilepsy Answering Service after 5PM and before 8:30AM: Ph#: [phone]\par  \par  \tab\par  \par  \par  \plain\f1\fs16\aojt5750\hich\f1\dbch\f1\loch\f1\cf2\fs16\strike\plain\f1\fs16\iidu9977\hich\f1\dbch\f1\loch\f1\cf2\fs16\plain\f1\fs16\lkzr8127\hich\f1\dbch\f1\loch\f1\cf2\fs16\par  \par  {\*\bkmkstart bkClinDocSignatures}{\*\bkmkend bkClinDocSignatures}{\*\bkmkstart bkcommentSBK}{\*\bkmkend bkcommentSBK}\plain\f1\fs16\ccle2437\hich\f1\dbch\f1\loch\f1\cf2\fs16\b Electronic Signatures:\plain\f0\fs20\fenj5641\hich\f0\dbch\f0\loch\f0\fs20\par  \pard\plain\f0\fs24\plain\f0\fs20\mdio3299\hich\f0\dbch\f0\loch\f0\fs20\par  \par  \par  \ql\plain\f0\fs24\plain\f0\fs20\yozv7013\hich\f0\dbch\f0\loch\f0\fs20\par  }  
Patient seen and examined this am. No new events    MEDICATIONS:    acetaminophen     Tablet .. 650 milliGRAM(s) Oral every 6 hours PRN  aluminum hydroxide/magnesium hydroxide/simethicone Suspension 30 milliLiter(s) Oral every 4 hours PRN  clonazePAM  Tablet 1 milliGRAM(s) Oral two times a day  dextrose 5%. 1000 milliLiter(s) IV Continuous <Continuous>  dextrose 5%. 1000 milliLiter(s) IV Continuous <Continuous>  dextrose 50% Injectable 25 Gram(s) IV Push once  dextrose 50% Injectable 25 Gram(s) IV Push once  dextrose 50% Injectable 12.5 Gram(s) IV Push once  dextrose Oral Gel 15 Gram(s) Oral once PRN  divalproex ER 1000 milliGRAM(s) Oral at bedtime  enoxaparin Injectable 40 milliGRAM(s) SubCutaneous every 24 hours  FLUoxetine Solution 10 milliGRAM(s) Oral daily  gabapentin 300 milliGRAM(s) Oral four times a day  glucagon  Injectable 1 milliGRAM(s) IntraMuscular once  lurasidone 120 milliGRAM(s) Oral daily  melatonin 3 milliGRAM(s) Oral at bedtime PRN  ondansetron Injectable 4 milliGRAM(s) IV Push every 8 hours PRN  QUEtiapine 200 milliGRAM(s) Oral four times a day  senna 2 Tablet(s) Oral at bedtime      LABS:                          11.0   4.69  )-----------( 196      ( 2023 07:35 )             35.2     04-    136  |  107  |  8   ----------------------------<  85  3.9   |  25  |  0.60    Ca    8.6      2023 07:35    TPro  8.3  /  Alb  3.3  /  TBili  0.2  /  DBili  x   /  AST  45<H>  /  ALT  23  /  AlkPhos  76  04-25    CAPILLARY BLOOD GLUCOSE      POCT Blood Glucose.: 83 mg/dL (2023 07:57)  POCT Blood Glucose.: 83 mg/dL (2023 16:04)  POCT Blood Glucose.: 78 mg/dL (2023 11:27)      Urinalysis Basic - ( 2023 00:09 )    Color: Yellow / Appearance: Clear / S.010 / pH: x  Gluc: x / Ketone: Trace  / Bili: Negative / Urobili: 1 mg/dL   Blood: x / Protein: 15 mg/dL / Nitrite: Negative   Leuk Esterase: Negative / RBC: 0-2 /HPF / WBC 0-2   Sq Epi: x / Non Sq Epi: x / Bacteria: Few      I&O's Summary    2023 07:01  -  2023 07:00  --------------------------------------------------------  IN: 480 mL / OUT: 0 mL / NET: 480 mL      Vital Signs Last 24 Hrs  T(C): 37.1 (2023 05:17), Max: 37.1 (2023 05:17)  T(F): 98.8 (2023 05:17), Max: 98.8 (2023 05:17)  HR: 76 (2023 05:17) (72 - 81)  BP: 103/67 (2023 05:17) (103/67 - 123/85)  BP(mean): --  RR: 18 (2023 05:17) (17 - 18)  SpO2: 100% (2023 05:17) (98% - 100%)    Parameters below as of 2023 05:17  Patient On (Oxygen Delivery Method): room air            On Neurological Examination:    Mental Status - Patient is alert, awake, follows simple commands no verbal oupt    Cranial Nerves - PERRL, extraocular movements intact with exotropia, VFF, V1-V3 intact, no gross facial asymmetry,    Motor Exam -   increased tone moves all ext appears to move right side more readily   Sensory    Intact to light touch and pinprick bilaterally    Coord: FTN intact bilaterally     Gait -deferred                                                   RADIOLOGY  < from: CT Head No Cont (23 @ 21:48) >  IMPRESSION:  No large territory acute infarct, intracranial hemorrhage, or mass   effect. Stable exam compared to 2009.    MRI is more sensitive for evaluation of postictal change and/or   epileptogenic foci.    --- End of Report ---      < end of copied text >                
PROGRESS NOTE:     Patient is a 27y old  Female who presents with a chief complaint of Seizure (2023 15:03)        SUBJECTIVE & OBJECTIVE:   Pt seen and examined at bedside in AM    no overnight events.     ROS: unable to examine due to [ ] Encephalopathy  [ ] Advanced Dementia  [ ] Expressive Aphasia  [x ] Non-verbal patient     PHYSICAL EXAM:  T(C): 37 (23 @ 17:29), Max: 37 (23 @ 17:29)  HR: 72 (23 @ 17:29) (71 - 86)  BP: 123/85 (23 @ 17:29) (107/73 - 136/84)  RR: 17 (23 @ 17:29) (17 - 18)  SpO2: 98% (23 @ 17:29) (97% - 100%)  Wt(kg): --     I&O's Detail        GENERAL: NAD,  no increased WOB  HEAD:  Atraumatic, Normocephalic  EYES: EOMI, PERRLA, conjunctiva and sclera clear  ENMT: Moist mucous membranes  NECK: Supple, No JVD  NERVOUS SYSTEM:  awake, nonverbal, protecting airway, moving extremities   CHEST/LUNG: Clear to auscultation bilaterally; No rales, rhonchi, wheezing, or rubs  HEART: Regular rate and rhythm; No murmurs, rubs, or gallops  ABDOMEN: Soft, Nontender, Nondistended; Bowel sounds present  EXTREMITIES:  2+ Peripheral Pulses b/l, No clubbing, cyanosis, calf tenderness or edema b/l    MEDICATIONS  (STANDING):  clonazePAM  Tablet 1 milliGRAM(s) Oral two times a day  dextrose 5%. 1000 milliLiter(s) (50 mL/Hr) IV Continuous <Continuous>  dextrose 5%. 1000 milliLiter(s) (100 mL/Hr) IV Continuous <Continuous>  dextrose 50% Injectable 25 Gram(s) IV Push once  dextrose 50% Injectable 25 Gram(s) IV Push once  dextrose 50% Injectable 12.5 Gram(s) IV Push once  divalproex ER 1000 milliGRAM(s) Oral at bedtime  enoxaparin Injectable 40 milliGRAM(s) SubCutaneous every 24 hours  FLUoxetine Solution 10 milliGRAM(s) Oral daily  gabapentin 300 milliGRAM(s) Oral four times a day  glucagon  Injectable 1 milliGRAM(s) IntraMuscular once  lurasidone 120 milliGRAM(s) Oral daily  QUEtiapine 200 milliGRAM(s) Oral four times a day  senna 2 Tablet(s) Oral at bedtime    MEDICATIONS  (PRN):  acetaminophen     Tablet .. 650 milliGRAM(s) Oral every 6 hours PRN Temp greater or equal to 38C (100.4F), Mild Pain (1 - 3)  aluminum hydroxide/magnesium hydroxide/simethicone Suspension 30 milliLiter(s) Oral every 4 hours PRN Dyspepsia  dextrose Oral Gel 15 Gram(s) Oral once PRN Blood Glucose LESS THAN 70 milliGRAM(s)/deciliter  melatonin 3 milliGRAM(s) Oral at bedtime PRN Insomnia  ondansetron Injectable 4 milliGRAM(s) IV Push every 8 hours PRN Nausea and/or Vomiting      LABS:                        11.0   4.69  )-----------( 196      ( 2023 07:35 )             35.2     04-    136  |  107  |  8   ----------------------------<  85  3.9   |  25  |  0.60    Ca    8.6      2023 07:35    TPro  8.3  /  Alb  3.3  /  TBili  0.2  /  DBili  x   /  AST  45<H>  /  ALT  23  /  AlkPhos  76  04-25      Urinalysis Basic - ( 2023 00:09 )    Color: Yellow / Appearance: Clear / S.010 / pH: x  Gluc: x / Ketone: Trace  / Bili: Negative / Urobili: 1 mg/dL   Blood: x / Protein: 15 mg/dL / Nitrite: Negative   Leuk Esterase: Negative / RBC: 0-2 /HPF / WBC 0-2   Sq Epi: x / Non Sq Epi: x / Bacteria: Few        CAPILLARY BLOOD GLUCOSE      POCT Blood Glucose.: 83 mg/dL (2023 16:04)  POCT Blood Glucose.: 78 mg/dL (2023 11:27)  POCT Blood Glucose.: 84 mg/dL (2023 08:15)  POCT Blood Glucose.: 86 mg/dL (2023 01:16)            RECENT CULTURES:          RADIOLOGY & ADDITIONAL TESTS:          Radiology reports read and imaging reviewed  :  [ x] YES  [ ] NO  (I am not a radiologist and therefore rely on Radiologist reports to facilitate with diagnosis and treatment plans)    Consultant(s) Notes Reviewed:  [x ] YES  [ ] NO    Care Discussed with Consultants/Other Providers [x ] YES  [ ] NO  Care plan and all findings were discussed in detail with patient.  All questions and concerns addressed

## 2023-04-28 NOTE — DISCHARGE NOTE NURSING/CASE MANAGEMENT/SOCIAL WORK - NSDCVIVACCINE_GEN_ALL_CORE_FT
Td (adult) preservative free; 14-Jun-2014 22:05; Georgina Cameron (RN); [id number]; IntraMuscular; Deltoid Left.; 0.5 milliLiter(s);   
Private car

## 2023-04-28 NOTE — DISCHARGE NOTE PROVIDER - NSDCFUADDAPPT_GEN_ALL_CORE_FT
It is important to see your primary physician as well as other necessary consultants within the next week to perform a comprehensive medical review.  Call their offices for an appointment as soon as you leave the hospital.  If you do not have a primary physician or cant reach him/her, contact the [facility] Physician Referral Service at [phone].  Your medical issues appear to be stable at this time, but if your symptoms recur or worsen, contact your physicians and/or return to the hospital if necessary.  If you encounter any issues or questions with your medication, call your physicians before stopping the medication.

## 2023-04-28 NOTE — PROGRESS NOTE ADULT - ASSESSMENT
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 Caretaker reports pt had 3 seizures (similar to her pseudoseizures); diffuse shaking lasting 10 - 15 secs in a 30 mins space PE as above no verbal outptut increased tone moves right more than left  CTH stable chronic left cerebellar atrophy  lactate upon presentation was 1.4    Impression: pseudoseizure vs seizure     extended EEG if possible  VPA level 79  On Depakote ER 1000 mg qhs    26 y/o F w/ PMHx of Cerebral Palsy (per caretaker, pt signs and does say a few words at baseline), Autism, DM type 2, Pseudoseizures presents to the ED s/p seizures. Caretaker reports pt had 3 seizures (similar to her pseudoseizures); diffuse shaking lasting 10 - 15 secs in a 30 mins space PE as above no verbal outptut increased tone moves right more than left  CTH stable chronic left cerebellar atrophy  lactate upon presentation was 1.4    Impression: pseudoseizure vs seizure     EEG normal  Can be discharged  VPA level 79  On Depakote ER 1000 mg qhs

## 2023-04-29 NOTE — EEG REPORT - NS EEG TEXT BOX
TIERNEY VALENCIA [id number] 27y (1995)F  Admitting MD: Dr. Rachelle Adam    Study Date: 04-28-23 0829  Study End Date 04-28-23 1948   Duration 11 hr   --------------------------------------------------------------------------------------------------  History:  CC/ HPI Patient is a 27y old  Female who presents with a chief complaint of Seizure (27 Apr 2023 09:11)        Medication Changes 24hours       --------------------------------------------------------------------------------------------------  Study Interpretation:    [[[Abbreviation Key:  PDR=alpha rhythm/posterior dominant rhythm. A-P=anterior posterior gradient.  Amplitude: ‘very low’:<20; ‘low’:20-50; ‘medium’:; ‘high’:>200uV.  Persistence for periodic/rhythmic patterns (% of epoch) ‘rare’:<1%; ‘occasional’:1-10%; ‘frequent’:10-50%; ‘abundant’:50-90%; ‘continuous’:>90%.  Persistence for sporadic discharges: ‘rare’:<1/hr; ‘occasional’:1/min-1/hr; ‘frequent’:>1/min; ‘abundant’:>1/10 sec.  GRDA=generalized rhythmic delta activity; FIRDA=frontal intermittent GRDA; LRDA=lateralized rhythmic delta activity; TIRDA=temporal intermittent rhythmic delta activity;  LPD=PLED=lateralized periodic discharges; GPD=generalized periodic discharges; BiPDs=BiPLEDs=bilateral independent periodic epileptiform discharges; SIRPID=stimulus induced rhythmic, periodic, or ictal appearing discharges; BIRDs=brief potentially ictal rhythmic discharges >4 Hz, lasting .5-10s; PFA (paroxysmal bursts >13 Hz or =8 Hz).  Modifiers: +F=with fast component; +S=with spike component; +R=with rhythmic component.  S-B=burst suppression pattern.  Max=maximal. N1-drowsy; N2-stage II sleep; N3-slow wave sleep. SSS/BETS=small sharp spikes/benign epileptiform transients of sleep. HV=hyperventilation; PS=photic stimulation]]]    FINDINGS:  The background was continuous, spontaneously variable and reactive.  During wakefulness, the posteriorly dominant rhythm consisted of symmetric, well modulated 8.5 Hz activity, with an amplitude to 40 uV, that attenuated to eye opening.  Low amplitude central beta was noted in wakefulness.    Background Slowing:  Generalized slowing: Diffuse theta / delta activity   Frontally predominant intermittent generalized rhythmic delta activity at 1 hz   Focal slowing: none was present.    Sleep Background:  -N2 sleep transients were not recorded.    Epileptiform Activity:   No interictal epileptiform discharges were present.    Events:  No clinical events were recorded.  No seizures were recorded.    Activation Procedures:   -Hyperventilation was not performed.    -Photic stimulation was not performed.    Artifacts:  Intermittent myogenic and external motion artifacts were noted.    ECG:  The heart rate on single channel ECG at baseline was predominantly near BPM =   -----------------------------------------------------------------------------------------------------    EEG Classification / Summary:  Abnormal EEG study, awake      - Frontally predominant intermittent generalized rhythmic delta activity at 1 hz   - Background slowing, generalized, mild to moderate     -----------------------------------------------------------------------------------------------------    Clinical Impression:  Abnormal EEG     - Intermittent paroxysmal frontally predominant rhythmic slow wave activity (GRDA) can be seen in the setting of etiologically non-specific encephalopathies, but may also be seen in the setting of increased ICP, or with a frontal or deep midline lesion.  - Mild diffuse / multifocal cerebral dysfunction, non-specific as to etiology  - There were no epileptiform abnormalities recorded.        This is a preliminary read    Connor Pacheco MD   Epilepsy Fellow, [facility]	          -------------------------------------------------------------------------------------------------------  [facility] EEG Reading Room Ph#: [phone]  Epilepsy Answering Service after 5PM and before 8:30AM: Ph#: [phone]    	   TIERNEY VALENCIA [id number] 27y (1995)F  Admitting MD: Dr. Rachelle Adam    Study Date: 04-28-23 0829  Study End Date 04-28-23 1948   Duration 11 hr   --------------------------------------------------------------------------------------------------  History:  CC/ HPI Patient is a 27y old  Female who presents with a chief complaint of Seizure (27 Apr 2023 09:11)    Medication Changes 24hours     --------------------------------------------------------------------------------------------------  Study Interpretation:    [[[Abbreviation Key:  PDR=alpha rhythm/posterior dominant rhythm. A-P=anterior posterior gradient.  Amplitude: ‘very low’:<20; ‘low’:20-50; ‘medium’:; ‘high’:>200uV.  Persistence for periodic/rhythmic patterns (% of epoch) ‘rare’:<1%; ‘occasional’:1-10%; ‘frequent’:10-50%; ‘abundant’:50-90%; ‘continuous’:>90%.  Persistence for sporadic discharges: ‘rare’:<1/hr; ‘occasional’:1/min-1/hr; ‘frequent’:>1/min; ‘abundant’:>1/10 sec.  GRDA=generalized rhythmic delta activity; FIRDA=frontal intermittent GRDA; LRDA=lateralized rhythmic delta activity; TIRDA=temporal intermittent rhythmic delta activity;  LPD=PLED=lateralized periodic discharges; GPD=generalized periodic discharges; BiPDs=BiPLEDs=bilateral independent periodic epileptiform discharges; SIRPID=stimulus induced rhythmic, periodic, or ictal appearing discharges; BIRDs=brief potentially ictal rhythmic discharges >4 Hz, lasting .5-10s; PFA (paroxysmal bursts >13 Hz or =8 Hz).  Modifiers: +F=with fast component; +S=with spike component; +R=with rhythmic component.  S-B=burst suppression pattern.  Max=maximal. N1-drowsy; N2-stage II sleep; N3-slow wave sleep. SSS/BETS=small sharp spikes/benign epileptiform transients of sleep. HV=hyperventilation; PS=photic stimulation]]]    FINDINGS:  The background was continuous, spontaneously variable and reactive.  During wakefulness, the posteriorly dominant rhythm consisted of symmetric, well modulated 8.5 Hz activity, with an amplitude to 40 uV, that attenuated to eye opening.  Low amplitude central beta was noted in wakefulness.    Background Slowing:  Generalized slowing: Diffuse theta / delta activity   Equivocal frontally predominant intermittent generalized rhythmic delta activity at 1 hz - may represent artifact, but video not recorded to disambiguate findings.   Focal slowing: none was present.    Sleep Background:  -N2 sleep transients were not recorded.    Epileptiform Activity:   No interictal epileptiform discharges were present.    Events:  No clinical events were recorded.  No seizures were recorded.    Activation Procedures:   -Hyperventilation was not performed.    -Photic stimulation was not performed.    Artifacts:  Intermittent myogenic and external motion artifacts were noted.    ECG:  The heart rate on single channel ECG at baseline was predominantly near BPM =   -----------------------------------------------------------------------------------------------------    EEG Classification / Summary:  Abnormal EEG study, awake      - Equivocal frontally predominant intermittent generalized rhythmic delta activity at 1 hz, may be artifact  - Background slowing, generalized, mild to moderate     -----------------------------------------------------------------------------------------------------    Clinical Impression:  Abnormal EEG     - Intermittent paroxysmal frontally predominant rhythmic slow wave activity (GRDA) is and equivocal findings, but when present can be seen in the setting of etiologically non-specific encephalopathies, but may also be seen in the setting of increased ICP, or with a frontal or deep midline lesion.  - Mild diffuse / multifocal cerebral dysfunction, non-specific as to etiology  - There were no epileptiform abnormalities recorded.      Connor Pacheco MD   Epilepsy Fellow, [facility]	    Ezra Gray MD PhD  Director, Epilepsy Division, [facility]   -------------------------------------------------------------------------------------------------------  [facility] EEG Reading Room Ph#: [phone]  Epilepsy Answering Service after 5PM and before 8:30AM: Ph#: [phone]

## 2023-05-02 DIAGNOSIS — F32.A DEPRESSION, UNSPECIFIED: ICD-10-CM

## 2023-05-02 DIAGNOSIS — F84.0 AUTISTIC DISORDER: ICD-10-CM

## 2023-05-02 DIAGNOSIS — L70.9 ACNE, UNSPECIFIED: ICD-10-CM

## 2023-05-02 DIAGNOSIS — Z91.013 ALLERGY TO SEAFOOD: ICD-10-CM

## 2023-05-02 DIAGNOSIS — Z88.0 ALLERGY STATUS TO PENICILLIN: ICD-10-CM

## 2023-05-02 DIAGNOSIS — E11.9 TYPE 2 DIABETES MELLITUS WITHOUT COMPLICATIONS: ICD-10-CM

## 2023-05-02 DIAGNOSIS — Z79.84 LONG TERM (CURRENT) USE OF ORAL HYPOGLYCEMIC DRUGS: ICD-10-CM

## 2023-05-02 DIAGNOSIS — G80.9 CEREBRAL PALSY, UNSPECIFIED: ICD-10-CM

## 2023-05-02 DIAGNOSIS — G93.89 OTHER SPECIFIED DISORDERS OF BRAIN: ICD-10-CM

## 2023-05-02 DIAGNOSIS — R56.9 UNSPECIFIED CONVULSIONS: ICD-10-CM

## 2023-08-17 ENCOUNTER — APPOINTMENT (OUTPATIENT)
Dept: RADIOLOGY | Facility: IMAGING CENTER | Age: 28
End: 2023-08-17

## 2023-09-21 ENCOUNTER — APPOINTMENT (OUTPATIENT)
Dept: OBGYN | Facility: HOSPITAL | Age: 28
End: 2023-09-21

## 2023-11-02 ENCOUNTER — APPOINTMENT (OUTPATIENT)
Dept: OBGYN | Facility: HOSPITAL | Age: 28
End: 2023-11-02

## 2023-11-08 ENCOUNTER — APPOINTMENT (OUTPATIENT)
Dept: OBGYN | Facility: HOSPITAL | Age: 28
End: 2023-11-08
Payer: MEDICAID

## 2023-11-08 ENCOUNTER — OUTPATIENT (OUTPATIENT)
Dept: OUTPATIENT SERVICES | Facility: HOSPITAL | Age: 28
LOS: 1 days | End: 2023-11-08

## 2023-11-08 ENCOUNTER — RESULT REVIEW (OUTPATIENT)
Age: 28
End: 2023-11-08

## 2023-11-08 ENCOUNTER — APPOINTMENT (OUTPATIENT)
Dept: OBGYN | Facility: HOSPITAL | Age: 28
End: 2023-11-08

## 2023-11-08 VITALS
HEART RATE: 100 BPM | WEIGHT: 163 LBS | HEIGHT: 64 IN | BODY MASS INDEX: 27.83 KG/M2 | SYSTOLIC BLOOD PRESSURE: 121 MMHG | DIASTOLIC BLOOD PRESSURE: 85 MMHG | TEMPERATURE: 98.1 F

## 2023-11-08 DIAGNOSIS — N62 HYPERTROPHY OF BREAST: ICD-10-CM

## 2023-11-08 DIAGNOSIS — N89.8 OTHER SPECIFIED NONINFLAMMATORY DISORDERS OF VAGINA: ICD-10-CM

## 2023-11-08 DIAGNOSIS — Z11.3 ENCOUNTER FOR SCREENING FOR INFECTIONS WITH A PREDOMINANTLY SEXUAL MODE OF TRANSMISSION: ICD-10-CM

## 2023-11-08 DIAGNOSIS — J38.3 OTHER DISEASES OF VOCAL CORDS: Chronic | ICD-10-CM

## 2023-11-08 DIAGNOSIS — K02.9 DENTAL CARIES, UNSPECIFIED: Chronic | ICD-10-CM

## 2023-11-08 DIAGNOSIS — Z12.4 ENCOUNTER FOR SCREENING FOR MALIGNANT NEOPLASM OF CERVIX: ICD-10-CM

## 2023-11-08 DIAGNOSIS — Z98.890 OTHER SPECIFIED POSTPROCEDURAL STATES: Chronic | ICD-10-CM

## 2023-11-08 PROCEDURE — XXXXX: CPT | Mod: 1L

## 2023-11-09 DIAGNOSIS — N89.8 OTHER SPECIFIED NONINFLAMMATORY DISORDERS OF VAGINA: ICD-10-CM

## 2023-11-09 DIAGNOSIS — R92.30 DENSE BREASTS, UNSPECIFIED: ICD-10-CM

## 2023-11-09 DIAGNOSIS — Z01.419 ENCOUNTER FOR GYNECOLOGICAL EXAMINATION (GENERAL) (ROUTINE) WITHOUT ABNORMAL FINDINGS: ICD-10-CM

## 2023-11-09 LAB
C TRACH RRNA SPEC QL NAA+PROBE: SIGNIFICANT CHANGE UP
C TRACH RRNA SPEC QL NAA+PROBE: SIGNIFICANT CHANGE UP
CANDIDA AB TITR SER: SIGNIFICANT CHANGE UP
CANDIDA AB TITR SER: SIGNIFICANT CHANGE UP
G VAGINALIS DNA SPEC QL NAA+PROBE: DETECTED
G VAGINALIS DNA SPEC QL NAA+PROBE: DETECTED
HPV HIGH+LOW RISK DNA PNL CVX: SIGNIFICANT CHANGE UP
HPV HIGH+LOW RISK DNA PNL CVX: SIGNIFICANT CHANGE UP
N GONORRHOEA RRNA SPEC QL NAA+PROBE: SIGNIFICANT CHANGE UP
N GONORRHOEA RRNA SPEC QL NAA+PROBE: SIGNIFICANT CHANGE UP
SPECIMEN SOURCE: SIGNIFICANT CHANGE UP
SPECIMEN SOURCE: SIGNIFICANT CHANGE UP
T VAGINALIS SPEC QL WET PREP: SIGNIFICANT CHANGE UP
T VAGINALIS SPEC QL WET PREP: SIGNIFICANT CHANGE UP

## 2023-11-11 LAB
CYTOLOGY SPEC DOC CYTO: SIGNIFICANT CHANGE UP
CYTOLOGY SPEC DOC CYTO: SIGNIFICANT CHANGE UP

## 2023-11-13 ENCOUNTER — NON-APPOINTMENT (OUTPATIENT)
Age: 28
End: 2023-11-13

## 2023-11-13 DIAGNOSIS — B96.89 ACUTE VAGINITIS: ICD-10-CM

## 2023-11-13 DIAGNOSIS — N76.0 ACUTE VAGINITIS: ICD-10-CM

## 2023-11-13 RX ORDER — METRONIDAZOLE 500 MG/1
500 TABLET ORAL
Qty: 14 | Refills: 0 | Status: ACTIVE | COMMUNITY
Start: 2023-11-13 | End: 1900-01-01

## 2023-11-17 ENCOUNTER — NON-APPOINTMENT (OUTPATIENT)
Age: 28
End: 2023-11-17

## 2023-12-10 NOTE — ED ADULT NURSE NOTE - ED STAT RN HANDOFF WHERE
Discussed the importance of healthy diet, nutrition, and lifestyle. Recommend low salt, fat/cholesterol diet and avoid concentrated sweets. Encouraged DASH diet along with fresh fruits & vegetables and low fat dairy products. Counseled patient to exercise/walk as tolerated. Avoid tobacco and alcohol use.  Avoid Acetaminophen, NSAIDs, Alcohol; and also avoid other hepatotoxic agents or OTCs without prior consultation with Provider; patient voiced  understanding.     ED

## 2023-12-22 NOTE — H&P PST ADULT - NS MD HP INPLANTS MED DEV
-- DO NOT REPLY / DO NOT REPLY ALL --  -- Message is from Engagement Center Operations (ECO) --    ONLY TO BE USED WITHIN A REFILL MEDICATION ENCOUNTER    Med Refill  Is the patient currently having any symptoms?: No/Non-Emergent symptoms    Name of medication requested: See pended med    Is this the first request for the medication in the last 48 hours?: Yes      Patient is requesting a medication refill - medication is on active list  Pt only need a week supply [facility]     Full name of the provider who ordered the medication: [facility] site name / Account # for provider: [place]     Preferred Pharmacy:     Caller Information         Type Contact Phone/Fax    12/22/2023 03:20 PM CST Phone (Incoming) Barrett Storey (Self) [phone] (M)            Alternative phone number:     Can a detailed message be left?: Yes    Patient is completely out of medication: Verify if patient is currently experiencing symptoms. If patient is symptomatic, proceed with front end triage instead of medication refill. If patient is not symptomatic but is completely out of medication, merissa as High priority when routing. Inform patient: “Please call back with any questions or concerns and if your condition becomes life threatening, you should seek immediate medical assistance by calling 911 or going to the Emergency Department for evaluation.”    Inform all patients: \"If the clinical team needs to contact you regarding this refill, please be aware the return phone call may come from an unidentified or out of state phone number and your refill request will be addressed as soon as the clinical team reviews your message.\"    Pt would like a print of of he meds to      
Patient needs week supply of Metoprolol Tartrate 50 mg BID, until [facility].    Sent to [facility] in [place] per patient request.   
None

## 2023-12-31 PROBLEM — Z11.3 ENCOUNTER FOR SCREENING EXAMINATION FOR SEXUALLY TRANSMITTED DISEASE: Status: RESOLVED | Noted: 2023-11-08 | Resolved: 2023-11-22

## 2024-02-05 NOTE — H&P PST ADULT - LYMPHATIC
Quality 226: Preventive Care And Screening: Tobacco Use: Screening And Cessation Intervention: Patient screened for tobacco use and is an ex/non-smoker
Detail Level: Detailed
supraclavicular R/anterior cervical R/supraclavicular L/posterior cervical L/posterior cervical R/anterior cervical L

## 2024-05-07 ENCOUNTER — OUTPATIENT (OUTPATIENT)
Dept: OUTPATIENT SERVICES | Facility: HOSPITAL | Age: 29
LOS: 1 days | End: 2024-05-07

## 2024-05-07 ENCOUNTER — APPOINTMENT (OUTPATIENT)
Dept: OBGYN | Facility: HOSPITAL | Age: 29
End: 2024-05-07
Payer: MEDICARE

## 2024-05-07 VITALS
BODY MASS INDEX: 29.06 KG/M2 | HEIGHT: 64 IN | SYSTOLIC BLOOD PRESSURE: 114 MMHG | HEART RATE: 100 BPM | TEMPERATURE: 97.7 F | DIASTOLIC BLOOD PRESSURE: 82 MMHG | WEIGHT: 170.19 LBS

## 2024-05-07 DIAGNOSIS — K02.9 DENTAL CARIES, UNSPECIFIED: Chronic | ICD-10-CM

## 2024-05-07 DIAGNOSIS — Z98.890 OTHER SPECIFIED POSTPROCEDURAL STATES: Chronic | ICD-10-CM

## 2024-05-07 DIAGNOSIS — J38.3 OTHER DISEASES OF VOCAL CORDS: Chronic | ICD-10-CM

## 2024-05-07 DIAGNOSIS — F84.0 AUTISTIC DISORDER: ICD-10-CM

## 2024-05-07 PROCEDURE — 99213 OFFICE O/P EST LOW 20 MIN: CPT

## 2024-05-07 NOTE — HISTORY OF PRESENT ILLNESS
[FreeTextEntry1] : 29 yo  nonverbal autistic patient presents for follow up.   Patient is a resident of [facility].  She is accompanied today by her formal caregivers.  No problems voiced. [No] : Patient does not have concerns regarding sex [Never active] : never active

## 2024-05-07 NOTE — DISCUSSION/SUMMARY
[FreeTextEntry1] : Results of recent breast sono and pelvic sono reviewed and all normal. Results of Affirm with BV explained.  Patient nonverbal and appears to be asymptomatic. Discussed vag care and hygiene Never sexually active Last pap 11/23 normal Advised follow up Gyn visit with pap in 3 years,

## 2024-05-08 DIAGNOSIS — F84.0 AUTISTIC DISORDER: ICD-10-CM

## 2024-05-20 ENCOUNTER — APPOINTMENT (OUTPATIENT)
Dept: OTOLARYNGOLOGY | Facility: CLINIC | Age: 29
End: 2024-05-20
Payer: MEDICARE

## 2024-05-20 ENCOUNTER — OUTPATIENT (OUTPATIENT)
Dept: OUTPATIENT SERVICES | Facility: HOSPITAL | Age: 29
LOS: 1 days | Discharge: HOME | End: 2024-05-20

## 2024-05-20 VITALS — WEIGHT: 170.19 LBS | BODY MASS INDEX: 29.06 KG/M2 | HEIGHT: 64 IN

## 2024-05-20 DIAGNOSIS — Z98.890 OTHER SPECIFIED POSTPROCEDURAL STATES: Chronic | ICD-10-CM

## 2024-05-20 DIAGNOSIS — H61.23 IMPACTED CERUMEN, BILATERAL: ICD-10-CM

## 2024-05-20 DIAGNOSIS — K02.9 DENTAL CARIES, UNSPECIFIED: Chronic | ICD-10-CM

## 2024-05-20 DIAGNOSIS — J38.3 OTHER DISEASES OF VOCAL CORDS: Chronic | ICD-10-CM

## 2024-05-20 DIAGNOSIS — H93.8X3 OTHER SPECIFIED DISORDERS OF EAR, BILATERAL: ICD-10-CM

## 2024-05-20 PROCEDURE — 69210 REMOVE IMPACTED EAR WAX UNI: CPT

## 2024-05-20 PROCEDURE — 99203 OFFICE O/P NEW LOW 30 MIN: CPT | Mod: 25

## 2024-05-20 NOTE — REASON FOR VISIT
[Initial Evaluation] : an initial evaluation for [Formal Caregiver] : formal caregiver [FreeTextEntry2] : ENT check up

## 2024-05-20 NOTE — HISTORY OF PRESENT ILLNESS
[de-identified] : 28 year old female presents for ENT check up Patient currently resides at the [facility]. Here with caregivers. Presents today for routine ENT check up No complaints at this time. Denies otalgia, otorrhea, dysphagia, hearing loss.

## 2024-05-20 NOTE — ASSESSMENT
[FreeTextEntry1] : 28 year nonverbal F with bilateral cerumen impaction. Patient tolerated cerumen removal without complaints.   Physical exam shows bilateral  ears normal EAC/TM. Patient states hearing is baseline after cerumen removal not interested in hearing test.   Recommend: Cerumen Impaction -Discussed not using q-tips or instruments to remove wax -Once it gets impacted recommend return to get it cleaned out.   -Return to clinic as needed or sooner if new/worsen symptoms present

## 2024-05-20 NOTE — PHYSICAL EXAM
[FreeTextEntry8] : cerumen impaction. removed [FreeTextEntry9] : cerumen impaction. removed [Normal] : mucosa is normal [Midline] : trachea located in midline position No

## 2024-05-22 DIAGNOSIS — H93.8X3 OTHER SPECIFIED DISORDERS OF EAR, BILATERAL: ICD-10-CM

## 2024-05-22 DIAGNOSIS — H61.23 IMPACTED CERUMEN, BILATERAL: ICD-10-CM

## 2025-05-19 ENCOUNTER — APPOINTMENT (OUTPATIENT)
Dept: OTOLARYNGOLOGY | Facility: CLINIC | Age: 30
End: 2025-05-19

## 2025-05-19 VITALS — WEIGHT: 166 LBS | HEIGHT: 64 IN | BODY MASS INDEX: 28.34 KG/M2

## 2025-05-19 DIAGNOSIS — H90.3 SENSORINEURAL HEARING LOSS, BILATERAL: ICD-10-CM

## 2025-05-19 DIAGNOSIS — H93.293 OTHER ABNORMAL AUDITORY PERCEPTIONS, BILATERAL: ICD-10-CM

## 2025-05-19 DIAGNOSIS — H61.23 IMPACTED CERUMEN, BILATERAL: ICD-10-CM

## 2025-05-19 PROCEDURE — 92582 CONDITIONING PLAY AUDIOMETRY: CPT

## 2025-05-19 PROCEDURE — G0268 REMOVAL OF IMPACTED WAX MD: CPT

## 2025-05-19 PROCEDURE — 92567 TYMPANOMETRY: CPT

## 2025-05-19 PROCEDURE — 99214 OFFICE O/P EST MOD 30 MIN: CPT | Mod: 25
